# Patient Record
Sex: FEMALE | Race: WHITE | NOT HISPANIC OR LATINO | Employment: OTHER | URBAN - METROPOLITAN AREA
[De-identification: names, ages, dates, MRNs, and addresses within clinical notes are randomized per-mention and may not be internally consistent; named-entity substitution may affect disease eponyms.]

---

## 2017-03-13 ENCOUNTER — TRANSCRIBE ORDERS (OUTPATIENT)
Dept: ADMINISTRATIVE | Facility: HOSPITAL | Age: 67
End: 2017-03-13

## 2017-03-13 DIAGNOSIS — R05.9 COUGH: Primary | ICD-10-CM

## 2017-03-13 DIAGNOSIS — F17.200 SMOKER, CURRENT STATUS UNKNOWN: ICD-10-CM

## 2017-03-23 ENCOUNTER — HOSPITAL ENCOUNTER (OUTPATIENT)
Dept: CT IMAGING | Facility: HOSPITAL | Age: 67
Discharge: HOME | End: 2017-03-23
Admitting: FAMILY MEDICINE

## 2017-03-23 DIAGNOSIS — F17.200 SMOKER, CURRENT STATUS UNKNOWN: ICD-10-CM

## 2017-03-23 DIAGNOSIS — R05.9 COUGH: ICD-10-CM

## 2017-03-23 PROCEDURE — G0297 LDCT FOR LUNG CA SCREEN: HCPCS

## 2017-03-28 ENCOUNTER — HOSPITAL ENCOUNTER (OUTPATIENT)
Dept: RESPIRATORY THERAPY | Facility: HOSPITAL | Age: 67
Discharge: HOME | End: 2017-03-28
Admitting: FAMILY MEDICINE

## 2017-03-28 DIAGNOSIS — F17.200 SMOKER, CURRENT STATUS UNKNOWN: ICD-10-CM

## 2017-03-28 LAB
BDY SITE: NORMAL
HGB BLDA-MCNC: 13.5 G/DL
SAO2 % BLDCOA: 92 % (ref 90–98.5)

## 2017-03-28 PROCEDURE — 94726 PLETHYSMOGRAPHY LUNG VOLUMES: CPT

## 2017-03-28 PROCEDURE — 94010 BREATHING CAPACITY TEST: CPT

## 2017-03-28 PROCEDURE — 82820 HEMOGLOBIN-OXYGEN AFFINITY: CPT | Performed by: FAMILY MEDICINE

## 2017-03-28 PROCEDURE — 94729 DIFFUSING CAPACITY: CPT

## 2017-12-14 ENCOUNTER — APPOINTMENT (OUTPATIENT)
Dept: WOMENS IMAGING | Facility: HOSPITAL | Age: 67
End: 2017-12-14

## 2017-12-14 PROCEDURE — 77063 BREAST TOMOSYNTHESIS BI: CPT | Performed by: RADIOLOGY

## 2017-12-14 PROCEDURE — G0202 SCR MAMMO BI INCL CAD: HCPCS | Performed by: RADIOLOGY

## 2019-10-23 ENCOUNTER — APPOINTMENT (OUTPATIENT)
Dept: WOMENS IMAGING | Facility: HOSPITAL | Age: 69
End: 2019-10-23

## 2019-10-23 PROCEDURE — 77063 BREAST TOMOSYNTHESIS BI: CPT | Performed by: RADIOLOGY

## 2019-10-23 PROCEDURE — 77067 SCR MAMMO BI INCL CAD: CPT | Performed by: RADIOLOGY

## 2021-01-14 ENCOUNTER — APPOINTMENT (OUTPATIENT)
Dept: WOMENS IMAGING | Facility: HOSPITAL | Age: 71
End: 2021-01-14

## 2021-01-14 PROCEDURE — 77063 BREAST TOMOSYNTHESIS BI: CPT | Performed by: RADIOLOGY

## 2021-01-14 PROCEDURE — 77067 SCR MAMMO BI INCL CAD: CPT | Performed by: RADIOLOGY

## 2021-03-16 ENCOUNTER — BULK ORDERING (OUTPATIENT)
Dept: CASE MANAGEMENT | Facility: OTHER | Age: 71
End: 2021-03-16

## 2021-03-16 DIAGNOSIS — Z23 IMMUNIZATION DUE: ICD-10-CM

## 2022-08-25 ENCOUNTER — APPOINTMENT (OUTPATIENT)
Dept: WOMENS IMAGING | Facility: HOSPITAL | Age: 72
End: 2022-08-25

## 2022-08-25 PROCEDURE — 77063 BREAST TOMOSYNTHESIS BI: CPT | Performed by: RADIOLOGY

## 2022-08-25 PROCEDURE — 77067 SCR MAMMO BI INCL CAD: CPT | Performed by: RADIOLOGY

## 2023-02-21 RX ORDER — EMPAGLIFLOZIN 10 MG/1
TABLET, FILM COATED ORAL
Qty: 30 TABLET | Refills: 2 | Status: SHIPPED | OUTPATIENT
Start: 2023-02-21

## 2023-02-21 RX ORDER — PROPRANOLOL HYDROCHLORIDE 40 MG/1
TABLET ORAL
Qty: 60 TABLET | Refills: 2 | Status: SHIPPED | OUTPATIENT
Start: 2023-02-21 | End: 2023-02-22 | Stop reason: SDUPTHER

## 2023-02-21 RX ORDER — SIMVASTATIN 40 MG
TABLET ORAL
Qty: 30 TABLET | Refills: 2 | Status: SHIPPED | OUTPATIENT
Start: 2023-02-21 | End: 2023-02-22 | Stop reason: SDUPTHER

## 2023-02-22 ENCOUNTER — OFFICE VISIT (OUTPATIENT)
Dept: FAMILY MEDICINE CLINIC | Facility: CLINIC | Age: 73
End: 2023-02-22
Payer: MEDICARE

## 2023-02-22 VITALS
TEMPERATURE: 98.1 F | HEART RATE: 69 BPM | BODY MASS INDEX: 35.17 KG/M2 | DIASTOLIC BLOOD PRESSURE: 74 MMHG | RESPIRATION RATE: 16 BRPM | WEIGHT: 198.5 LBS | SYSTOLIC BLOOD PRESSURE: 105 MMHG | HEIGHT: 63 IN | OXYGEN SATURATION: 93 %

## 2023-02-22 DIAGNOSIS — E78.2 MIXED HYPERLIPIDEMIA: ICD-10-CM

## 2023-02-22 DIAGNOSIS — E11.40 TYPE 2 DIABETES MELLITUS WITH DIABETIC NEUROPATHY, WITHOUT LONG-TERM CURRENT USE OF INSULIN: Primary | ICD-10-CM

## 2023-02-22 DIAGNOSIS — R79.89 ELEVATED LIVER FUNCTION TESTS: ICD-10-CM

## 2023-02-22 DIAGNOSIS — F41.1 GENERALIZED ANXIETY DISORDER: ICD-10-CM

## 2023-02-22 PROBLEM — G62.9 NEUROPATHY: Status: ACTIVE | Noted: 2017-06-13

## 2023-02-22 PROBLEM — F51.01 PRIMARY INSOMNIA: Status: ACTIVE | Noted: 2023-02-22

## 2023-02-22 PROCEDURE — 99214 OFFICE O/P EST MOD 30 MIN: CPT | Performed by: FAMILY MEDICINE

## 2023-02-22 RX ORDER — GABAPENTIN 300 MG/1
CAPSULE ORAL
COMMUNITY
Start: 2023-02-02

## 2023-02-22 RX ORDER — NORTRIPTYLINE HYDROCHLORIDE 25 MG/1
CAPSULE ORAL
COMMUNITY
Start: 2023-01-22 | End: 2023-02-22 | Stop reason: SDUPTHER

## 2023-02-22 RX ORDER — PROPRANOLOL HYDROCHLORIDE 40 MG/1
40 TABLET ORAL 2 TIMES DAILY
Qty: 180 TABLET | Refills: 1 | Status: SHIPPED | OUTPATIENT
Start: 2023-02-22

## 2023-02-22 RX ORDER — NORTRIPTYLINE HYDROCHLORIDE 25 MG/1
25 CAPSULE ORAL NIGHTLY
Qty: 90 CAPSULE | Refills: 1 | Status: SHIPPED | OUTPATIENT
Start: 2023-02-22

## 2023-02-22 RX ORDER — CLONAZEPAM 1 MG/1
TABLET ORAL
COMMUNITY
Start: 2023-02-02 | End: 2023-03-05

## 2023-02-22 RX ORDER — CHLORAL HYDRATE 500 MG
1000 CAPSULE ORAL
COMMUNITY

## 2023-02-22 RX ORDER — OMEPRAZOLE 20 MG/1
20 TABLET, DELAYED RELEASE ORAL DAILY
COMMUNITY

## 2023-02-22 RX ORDER — MELOXICAM 15 MG/1
TABLET ORAL
COMMUNITY
Start: 2022-11-23

## 2023-02-22 RX ORDER — SIMVASTATIN 40 MG
40 TABLET ORAL DAILY
Qty: 90 TABLET | Refills: 2 | Status: SHIPPED | OUTPATIENT
Start: 2023-02-22

## 2023-02-22 RX ORDER — NAPROXEN 500 MG/1
500 TABLET ORAL
COMMUNITY

## 2023-02-22 RX ORDER — ALPRAZOLAM 0.5 MG/1
0.5 TABLET ORAL
COMMUNITY

## 2023-02-22 NOTE — PATIENT INSTRUCTIONS
Continue your current medications.   You can try increasing the gabapentin to 3 at night.  You had lab tests today. You should receive a call or my chart message with your test results. If you have not received your results in the next 7-10 days, please contact the office.    See neurologist as scheduled.

## 2023-02-22 NOTE — PROGRESS NOTES
"Chief Complaint  Hyperlipidemia, Diabetes, and Anxiety    Subjective    History of Present Illness {CC  Problem List  Visit  Diagnosis   Encounters  Notes  Medications  Labs  Result Review Imaging  Media :23}     Dana Cerda presents to [facility] for Hyperlipidemia, Diabetes, and Anxiety.  Hyperlipidemia  This is a chronic problem. The problem is controlled. Current antihyperlipidemic treatment includes statins (She is on simvastatin and her last LDL was 61.).   Diabetes  She presents for her follow-up diabetic visit. She has type 2 diabetes mellitus. The initial diagnosis of diabetes was made 6 months ago. Associated symptoms include foot paresthesias (worsening. She has history of neuropathy since 2017 but diabetes more recently diagnosed. She is scheduled with new neurologist.). Symptoms are improving (She is currently on jardiance and monitors her sugars daily and are generally 130-140s. She is up to date on eye exam. ).   She is also here for follow up of anxiety. Her anxiety is exacerbated by stress. She is on clonazepam at night but is not working as well. Her sleep is also worse despite klonopin. She contributes some of her sleep issues to her foot pain related to her neuropathy. She is on gabapentin 300mg 1 in am and 2 at night. Occasionally she takes and additional gabapentin mid day. She  Can't take more during the day due to side effects. She is scheduled to see a new neurologist for the neuropathy.       Objective     Vital Signs:   /74   Pulse 69   Temp 98.1 °F (36.7 °C) (Oral)   Resp 16   Ht 160 cm (63\")   Wt 90 kg (198 lb 8 oz)   SpO2 93%   BMI 35.16 kg/m²   Body mass index is 35.16 kg/m².     Physical Exam  Constitutional:       General: She is not in acute distress.  Cardiovascular:      Rate and Rhythm: Normal rate and regular rhythm.      Heart sounds: No murmur heard.  Pulmonary:      Effort: Pulmonary effort is normal. No respiratory " distress.   Neurological:      Mental Status: She is alert.      Cranial Nerves: No cranial nerve deficit.      Sensory: Sensory deficit (bilateral distal foot) present.          Result Review  Data Reviewed:{ Labs  Result Review  Imaging  Med Tab  Media :23}                Assessment and Plan {CC Problem List  Visit Diagnosis  ROS  Review (Popup)  Health Maintenance  Quality  BestPractice  Medications  SmartSets  SnapShot Encounters  Media :23}   Diagnoses and all orders for this visit:    1. Type 2 diabetes mellitus with diabetic neuropathy, without long-term current use of insulin (HCC) (Primary)  -     Comprehensive Metabolic Panel  -     Hemoglobin A1c    2. Mixed hyperlipidemia  -     Lipid Panel  -     Comprehensive Metabolic Panel    3. Generalized anxiety disorder    Other orders  -     simvastatin (ZOCOR) 40 MG tablet; Take 1 tablet by mouth Daily.  Dispense: 90 tablet; Refill: 2  -     propranolol (INDERAL) 40 MG tablet; Take 1 tablet by mouth 2 (Two) Times a Day.  Dispense: 180 tablet; Refill: 1  -     nortriptyline (PAMELOR) 25 MG capsule; Take 1 capsule by mouth Every Night.  Dispense: 90 capsule; Refill: 1        Patient Instructions   Continue your current medications.   You can try increasing the gabapentin to 3 at night.  You had lab tests today. You should receive a call or my chart message with your test results. If you have not received your results in the next 7-10 days, please contact the office.    See neurologist as scheduled.       Driss reviewed and was appropriate.  Patient was given instructions and counseling regarding her condition or for health maintenance advice on the AVS.       Return in about 3 months (around 5/22/2023) for Recheck.    Anel Jeronimo MD

## 2023-02-23 LAB
ALBUMIN SERPL-MCNC: 4.8 G/DL (ref 3.8–4.8)
ALBUMIN/GLOB SERPL: 1.7 {RATIO} (ref 1.2–2.2)
ALP SERPL-CCNC: 112 IU/L (ref 44–121)
ALT SERPL-CCNC: 94 IU/L (ref 0–32)
AST SERPL-CCNC: 134 IU/L (ref 0–40)
BILIRUB SERPL-MCNC: 0.8 MG/DL (ref 0–1.2)
BUN SERPL-MCNC: 9 MG/DL (ref 8–27)
BUN/CREAT SERPL: 13 (ref 12–28)
CALCIUM SERPL-MCNC: 10 MG/DL (ref 8.7–10.3)
CHLORIDE SERPL-SCNC: 95 MMOL/L (ref 96–106)
CHOLEST SERPL-MCNC: 162 MG/DL (ref 100–199)
CO2 SERPL-SCNC: 25 MMOL/L (ref 20–29)
CREAT SERPL-MCNC: 0.7 MG/DL (ref 0.57–1)
EGFRCR SERPLBLD CKD-EPI 2021: 93 ML/MIN/1.73
GLOBULIN SER CALC-MCNC: 2.9 G/DL (ref 1.5–4.5)
GLUCOSE SERPL-MCNC: 132 MG/DL (ref 70–99)
HBA1C MFR BLD: 7.2 % (ref 4.8–5.6)
HDLC SERPL-MCNC: 55 MG/DL
LDLC SERPL CALC-MCNC: 56 MG/DL (ref 0–99)
POTASSIUM SERPL-SCNC: 4.3 MMOL/L (ref 3.5–5.2)
PROT SERPL-MCNC: 7.7 G/DL (ref 6–8.5)
SODIUM SERPL-SCNC: 140 MMOL/L (ref 134–144)
TRIGL SERPL-MCNC: 331 MG/DL (ref 0–149)
VLDLC SERPL CALC-MCNC: 51 MG/DL (ref 5–40)

## 2023-02-24 ENCOUNTER — TELEPHONE (OUTPATIENT)
Dept: FAMILY MEDICINE CLINIC | Facility: CLINIC | Age: 73
End: 2023-02-24
Payer: MEDICARE

## 2023-02-24 NOTE — TELEPHONE ENCOUNTER
Caller: Dana Cerda    Relationship: Self    Best call back number: [phone]    What medication are you requesting: MEDICATION TO TREAT SYMPTOMS, ANTIBIOTIC    What are your current symptoms: FREQUENT, PAINFUL URINATION    How long have you been experiencing symptoms: SINCE 02-    Have you had these symptoms before:    [x] Yes  [] No    Have you been treated for these symptoms before:   [x] Yes  [] No    If a prescription is needed, what is your preferred pharmacy and phone number: [facility] [id number] - [facility] [address] [facility] [phone] [facility] [phone]      Additional notes: PATIENT STATED SHE USED AN AT HOME TEST TRIP AND LEUCOCYTES WERE POSITIVE, AND NITRITES WERE NEGATIVE.    PATIENT IS REQUESTING TO KNOW IF A MEDICATION MAY BE SENT IN, OR IF SHE MUST BE SEEN FIRST, AS SHE WAS RECENTLY ALREADY SEEN ON 02-.    PLEASE CALL TO DISCUSS AND ADVISE

## 2023-02-24 NOTE — TELEPHONE ENCOUNTER
Unfortunately she was seen for an unrelated issue and did not have those symptoms at that time.  She should really be seen and have a urinalysis and possible culture sent before starting antibiotic.  Since this is Friday she may need to go to an urgent care.

## 2023-03-03 DIAGNOSIS — F41.1 GENERALIZED ANXIETY DISORDER: Primary | ICD-10-CM

## 2023-03-03 NOTE — TELEPHONE ENCOUNTER
Rx Refill Note  Requested Prescriptions     Pending Prescriptions Disp Refills   • clonazePAM (KlonoPIN) 1 MG tablet [Pharmacy Med Name: CLONAZEPAM 1 MG TABLET] 30 tablet      Sig: TAKE ONE TABLET BY MOUTH DAILY AS NEEDED FOR ANXIETY      Last office visit with prescribing clinician: 2/22/2023   Next office visit with prescribing clinician: 5/22/2023     Reyes Calhoun CMA  03/03/23, 16:05 EST

## 2023-03-05 RX ORDER — CLONAZEPAM 1 MG/1
TABLET ORAL
Qty: 30 TABLET | Refills: 2 | Status: SHIPPED | OUTPATIENT
Start: 2023-03-05

## 2023-03-22 ENCOUNTER — OFFICE VISIT (OUTPATIENT)
Dept: GASTROENTEROLOGY | Facility: CLINIC | Age: 73
End: 2023-03-22
Payer: MEDICARE

## 2023-03-22 VITALS
HEIGHT: 63 IN | DIASTOLIC BLOOD PRESSURE: 82 MMHG | TEMPERATURE: 97.8 F | HEART RATE: 99 BPM | BODY MASS INDEX: 35.26 KG/M2 | SYSTOLIC BLOOD PRESSURE: 125 MMHG | WEIGHT: 199 LBS

## 2023-03-22 DIAGNOSIS — E78.49 OTHER HYPERLIPIDEMIA: ICD-10-CM

## 2023-03-22 DIAGNOSIS — R74.9 ABNORMAL SERUM ENZYME LEVEL, UNSPECIFIED: ICD-10-CM

## 2023-03-22 DIAGNOSIS — R79.89 ELEVATED LIVER FUNCTION TESTS: Primary | ICD-10-CM

## 2023-03-22 DIAGNOSIS — R94.5 ABNORMAL RESULTS OF LIVER FUNCTION STUDIES: ICD-10-CM

## 2023-03-22 DIAGNOSIS — R68.89 OTHER GENERAL SYMPTOMS AND SIGNS: ICD-10-CM

## 2023-03-22 DIAGNOSIS — R74.8 ABNORMAL LEVELS OF OTHER SERUM ENZYMES: ICD-10-CM

## 2023-03-22 PROCEDURE — 99204 OFFICE O/P NEW MOD 45 MIN: CPT | Performed by: NURSE PRACTITIONER

## 2023-03-22 PROCEDURE — 1159F MED LIST DOCD IN RCRD: CPT | Performed by: NURSE PRACTITIONER

## 2023-03-22 PROCEDURE — 1160F RVW MEDS BY RX/DR IN RCRD: CPT | Performed by: NURSE PRACTITIONER

## 2023-03-22 NOTE — PROGRESS NOTES
Chief Complaint   Patient presents with   • Elevated Hepatic Enzymes       HPI    Dana Cerda is a  72 y.o. female here to establish care as a new patient for elevated liver function test.    This patient will also follow with Dr. Kinney.    Past medical history of anxiety, breast cancer, irritable bowel syndrome, fatty liver, hyperlipidemia, diabetes along with sleep disorder.    Patient reports she is been aware of elevated liver function test for a number of years she is even been told she has fatty liver at 1 point. Labs from 4 weeks ago with , ALT 94,  with normal bilirubin.  Patient noted to have mildly elevated transaminases dating back to 2014.  She has never had a formal work-up for this to rule out autoimmune, viral or assess for metabolic liver disease.  No recent liver imaging.    She reports increased alcohol consumption in her younger years but rarely uses alcohol now.  May be 1 or 2 drinks a year.  Denies right upper quadrant pain.  No nausea or vomiting.  She takes daily PPI therapy to manage GERD.  Her appetite is good.  Weight is stable.  BMI 35.25.    She reports irritable bowel syndrome that fluctuates between diarrhea and constipation but this has been stable as of late.  Denies personal history of colon polyps or family history colon cancer.    [facility] - fixed angulated sigmoid colon.  Incomplete exam.  Significant sigmoid diverticulosis with internal hemorrhoids.  Plans for repeat 2027.    C-scope 2017 with diverticulosis. (Dr. Angeles)    Past Medical History:   Diagnosis Date   • Abnormal blood finding    • Abnormal finding on radiological examination of breast     calcifications   • Abnormal results of liver function studies    • Abnormal weight gain    • Acute bronchitis, unspecified    • Acute bronchospasm    • Allergic urticaria    • Anxiety state    • Back pain    • Blood in stool    • Breast cancer in situ    • Constipation, unspecified    •  Cough    • Diarrhea, unspecified    • Diverticulitis of large intestine without perforation or abscess without bleeding    • Dysuria    • Edema    • Essential (primary) hypertension    • Fatty liver    • Fracture of radius, distal, closed    • SHIRA (generalized anxiety disorder)    • Hematuria    • Hyperglycemia    • Hyperlipidemia    • Infection of nail bed of finger of right hand     right index finger   • Irritable bowel syndrome    • Left lower quadrant pain    • Left shoulder pain     with adhesive capsulitis   • Lump or mass in breast     solid on left   • Myalgia    • Neuropathy    • Paresthesia    • Sleep disorder, unspecified    • Tobacco use    • Type 2 diabetes mellitus (HCC)    • 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      PRN Meds:     Allergies   Allergen Reactions   • Iodinated Contrast Media Shortness Of Breath     PT STATES HAD THROAT SWELLING WITH CT SCAN    • Penicillins Other (See Comments)     STATES WAS 12 YRS AND SHUT DOWN WHOLE BODY PER PT   • Cefaclor Nausea And Vomiting       Social History     Socioeconomic History   • Marital status:    • Number of children: 4   Tobacco Use   • Smoking status: Former     Packs/day: 1.00     Years: 40.00     Pack years: 40.00     Types: Cigarettes     Start date: 1967     Quit date: 2019     Years since quittin.2     Passive exposure: Past   • Smokeless tobacco: Never   Vaping Use   • Vaping Use: Never used   Substance and Sexual Activity   • Alcohol use: Not  Currently     Comment: A beer or glass of wine occassionally...socially   • Drug use: Never   • Sexual activity: Not Currently     Partners: Male     Comment: hysterectomy       Family History   Problem Relation Age of Onset   • Hyperlipidemia Mother    • Irritable bowel syndrome Mother    • Thyroid cancer Maternal Aunt    • Colon cancer Maternal Aunt    • Alcohol abuse Sister        Review of Systems   Constitutional: Negative for activity change, appetite change, fatigue and unexpected weight change.   HENT: Negative for trouble swallowing.    Eyes: Negative.    Respiratory: Negative.    Cardiovascular: Negative.    Gastrointestinal: Negative for abdominal distention, abdominal pain, anal bleeding, blood in stool, constipation, diarrhea, nausea, rectal pain and vomiting.   Endocrine: Negative.    Genitourinary: Negative.    Musculoskeletal: Negative.    Allergic/Immunologic: Negative.    Neurological: Negative.    Hematological: Negative.    Psychiatric/Behavioral: Negative.        Vitals:    03/22/23 1257   BP: 125/82   Pulse: 99   Temp: 97.8 °F (36.6 °C)       Physical Exam  Constitutional:       Appearance: She is well-developed. She is obese.   Abdominal:      General: Bowel sounds are normal. There is no distension.      Palpations: Abdomen is soft. There is no mass.      Tenderness: There is no abdominal tenderness. There is no guarding.      Hernia: No hernia is present.   Skin:     General: Skin is warm and dry.      Capillary Refill: Capillary refill takes less than 2 seconds.   Neurological:      Mental Status: She is alert and oriented to person, place, and time.   Psychiatric:         Behavior: Behavior normal.     Assessment    Diagnoses and all orders for this visit:    Elevated liver function tests (Primary)  -     US Liver; Future  -     Alpha - 1 - Antitrypsin  -     CLAIRE  -     Anti-Smooth Muscle Antibody Titer  -     Ceruloplasmin  -     Gamma GT  -     Hemochromatosis Mutation  -     Hepatitis  Panel, Acute  -     Mitochondrial Antibodies, M2  -     CBC & Differential  -     Comprehensive Metabolic Panel  -     HILL Fibrosure  -     TSH  -     Celiac Comprehensive Panel  -     Protime-INR     Plan    Pleasant 72-year-old female with several years of mildly elevated transaminases presenting today for new patient consult.  At this point recommend serologic work-up as above to rule out autoimmune, viral, metabolic liver disease.  High suspicion for metabolic disease given her history of diabetes and elevated BMI as well as reported history of fatty liver disease.  Recommend liver ultrasound as well.  We discussed the possibility of a liver biopsy if warranted but will await work-up first.  Follow-up 3 months.         SATHISH Kenyon  [facility]  [address] [place] [address]  Office: [phone]

## 2023-03-23 LAB
A1AT SERPL-MCNC: 158 MG/DL (ref 101–187)
ALBUMIN SERPL-MCNC: 4.5 G/DL (ref 3.8–4.8)
ALBUMIN/GLOB SERPL: 1.7 {RATIO} (ref 1.2–2.2)
ALP SERPL-CCNC: 113 IU/L (ref 44–121)
ALT SERPL-CCNC: 85 IU/L (ref 0–32)
ANA SER QL: NEGATIVE
AST SERPL-CCNC: 71 IU/L (ref 0–40)
BASOPHILS # BLD AUTO: 0.1 X10E3/UL (ref 0–0.2)
BASOPHILS NFR BLD AUTO: 1 %
BILIRUB SERPL-MCNC: 0.6 MG/DL (ref 0–1.2)
BUN SERPL-MCNC: 10 MG/DL (ref 8–27)
BUN/CREAT SERPL: 14 (ref 12–28)
CALCIUM SERPL-MCNC: 9.7 MG/DL (ref 8.7–10.3)
CERULOPLASMIN SERPL-MCNC: 31.4 MG/DL (ref 19–39)
CHLORIDE SERPL-SCNC: 96 MMOL/L (ref 96–106)
CO2 SERPL-SCNC: 27 MMOL/L (ref 20–29)
CREAT SERPL-MCNC: 0.74 MG/DL (ref 0.57–1)
EGFRCR SERPLBLD CKD-EPI 2021: 87 ML/MIN/1.73
ENDOMYSIUM IGA SER QL: NEGATIVE
EOSINOPHIL # BLD AUTO: 0.1 X10E3/UL (ref 0–0.4)
EOSINOPHIL NFR BLD AUTO: 2 %
ERYTHROCYTE [DISTWIDTH] IN BLOOD BY AUTOMATED COUNT: 13 % (ref 11.7–15.4)
GGT SERPL-CCNC: 184 IU/L (ref 0–60)
GLIADIN PEPTIDE IGA SER-ACNC: 4 UNITS (ref 0–19)
GLIADIN PEPTIDE IGG SER-ACNC: 2 UNITS (ref 0–19)
GLOBULIN SER CALC-MCNC: 2.6 G/DL (ref 1.5–4.5)
GLUCOSE SERPL-MCNC: 143 MG/DL (ref 70–99)
HAV IGM SERPL QL IA: NEGATIVE
HBV CORE IGM SERPL QL IA: NEGATIVE
HBV SURFACE AG SERPL QL IA: NEGATIVE
HCT VFR BLD AUTO: 44.6 % (ref 34–46.6)
HCV AB SERPL QL IA: NORMAL
HCV IGG SERPL QL IA: NON REACTIVE
HGB BLD-MCNC: 15.1 G/DL (ref 11.1–15.9)
IGA SERPL-MCNC: 200 MG/DL (ref 87–352)
IMM GRANULOCYTES # BLD AUTO: 0 X10E3/UL (ref 0–0.1)
IMM GRANULOCYTES NFR BLD AUTO: 0 %
INR PPP: 0.9 (ref 0.9–1.2)
LYMPHOCYTES # BLD AUTO: 2.2 X10E3/UL (ref 0.7–3.1)
LYMPHOCYTES NFR BLD AUTO: 37 %
MCH RBC QN AUTO: 31.1 PG (ref 26.6–33)
MCHC RBC AUTO-ENTMCNC: 33.9 G/DL (ref 31.5–35.7)
MCV RBC AUTO: 92 FL (ref 79–97)
MITOCHONDRIA M2 IGG SER-ACNC: <20 UNITS (ref 0–20)
MONOCYTES # BLD AUTO: 0.3 X10E3/UL (ref 0.1–0.9)
MONOCYTES NFR BLD AUTO: 6 %
NEUTROPHILS # BLD AUTO: 3.2 X10E3/UL (ref 1.4–7)
NEUTROPHILS NFR BLD AUTO: 54 %
PLATELET # BLD AUTO: 212 X10E3/UL (ref 150–450)
POTASSIUM SERPL-SCNC: 4.5 MMOL/L (ref 3.5–5.2)
PROT SERPL-MCNC: 7.1 G/DL (ref 6–8.5)
PROTHROMBIN TIME: 9.8 SEC (ref 9.1–12)
RBC # BLD AUTO: 4.86 X10E6/UL (ref 3.77–5.28)
SMA IGG SER-ACNC: 10 UNITS (ref 0–19)
SODIUM SERPL-SCNC: 142 MMOL/L (ref 134–144)
TSH SERPL DL<=0.005 MIU/L-ACNC: 2.66 UIU/ML (ref 0.45–4.5)
TTG IGA SER-ACNC: <2 U/ML (ref 0–3)
TTG IGG SER-ACNC: <2 U/ML (ref 0–5)
WBC # BLD AUTO: 5.9 X10E3/UL (ref 3.4–10.8)

## 2023-03-25 LAB
A2 MACROGLOB SERPL-MCNC: 159 MG/DL (ref 110–276)
ALT SERPL W P-5'-P-CCNC: 89 IU/L (ref 0–40)
APO A-I SERPL-MCNC: 175 MG/DL (ref 116–209)
AST SERPL W P-5'-P-CCNC: 82 IU/L (ref 0–40)
BILIRUB SERPL-MCNC: 0.5 MG/DL (ref 0–1.2)
CHOLEST SERPL-MCNC: 155 MG/DL (ref 100–199)
FIBROSIS SCORING:: ABNORMAL
FIBROSIS STAGE SERPL QL: ABNORMAL
GGT SERPL-CCNC: 189 IU/L (ref 0–60)
GLUCOSE SERPL-MCNC: 137 MG/DL (ref 70–99)
HAPTOGLOB SERPL-MCNC: 158 MG/DL (ref 37–355)
INTERPRETATIONS: (REFERENCE): ABNORMAL
LABORATORY COMMENT REPORT: ABNORMAL
LIVER FIBR SCORE SERPL CALC.FIBROSURE: 0.26 (ref 0–0.21)
NASH SCORING (REFERENCE): ABNORMAL
NECROINFLAMMATORY ACT GRADE SERPL QL: ABNORMAL
NECROINFLAMMATORY ACT SCORE SERPL: 0.75
SERVICE CMNT-IMP: ABNORMAL
STEATOSIS GRADE (REFERENCE): ABNORMAL
STEATOSIS GRADING (REFERENCE): ABNORMAL
STEATOSIS SCORE (REFERENCE): 0.91 (ref 0–0.3)
TRIGL SERPL-MCNC: 260 MG/DL (ref 0–149)

## 2023-03-28 LAB — HFE GENE MUT ANL BLD/T: NORMAL

## 2023-03-31 NOTE — PROGRESS NOTES
Please inform the patient serologic work-up leaning towards fatty liver disease.  Await liver ultrasound.  Keep follow-up with Dr. Kinney. 84

## 2023-04-01 ENCOUNTER — TELEPHONE (OUTPATIENT)
Dept: GASTROENTEROLOGY | Facility: CLINIC | Age: 73
End: 2023-04-01
Payer: MEDICARE

## 2023-04-01 NOTE — TELEPHONE ENCOUNTER
----- Message from SATHISH Kenyon sent at 3/31/2023 11:26 AM EDT -----  Please inform the patient serologic work-up leaning towards fatty liver disease.  Await liver ultrasound.  Keep follow-up with Dr. Kinney.

## 2023-04-27 DIAGNOSIS — E11.40 TYPE 2 DIABETES MELLITUS WITH DIABETIC NEUROPATHY, WITHOUT LONG-TERM CURRENT USE OF INSULIN: Primary | ICD-10-CM

## 2023-04-27 RX ORDER — GABAPENTIN 300 MG/1
CAPSULE ORAL
Qty: 120 CAPSULE | Refills: 0 | Status: SHIPPED | OUTPATIENT
Start: 2023-04-27

## 2023-05-09 ENCOUNTER — HOSPITAL ENCOUNTER (OUTPATIENT)
Dept: ULTRASOUND IMAGING | Facility: HOSPITAL | Age: 73
Discharge: HOME | End: 2023-05-09
Admitting: NURSE PRACTITIONER
Payer: MEDICARE

## 2023-05-09 DIAGNOSIS — R79.89 ELEVATED LIVER FUNCTION TESTS: ICD-10-CM

## 2023-05-09 PROCEDURE — 76705 ECHO EXAM OF ABDOMEN: CPT

## 2023-05-09 NOTE — PROGRESS NOTES
Please inform the patient liver ultrasound shows hepatic steatosis or fatty liver without findings of cirrhosis.  Treatment for fatty liver is low-fat diet, increase cardiovascular exercise and weight reduction to achieve an ideal BMI over the next 3 years.  Keep upcoming appoint with Dr. Kinney to discuss further.

## 2023-05-20 RX ORDER — EMPAGLIFLOZIN 10 MG/1
TABLET, FILM COATED ORAL
Qty: 30 TABLET | Refills: 2 | Status: SHIPPED | OUTPATIENT
Start: 2023-05-20 | End: 2023-05-22 | Stop reason: SDUPTHER

## 2023-05-22 ENCOUNTER — OFFICE VISIT (OUTPATIENT)
Dept: FAMILY MEDICINE CLINIC | Facility: CLINIC | Age: 73
End: 2023-05-22
Payer: MEDICARE

## 2023-05-22 VITALS
WEIGHT: 198.7 LBS | SYSTOLIC BLOOD PRESSURE: 120 MMHG | BODY MASS INDEX: 35.21 KG/M2 | TEMPERATURE: 97.7 F | OXYGEN SATURATION: 96 % | HEIGHT: 63 IN | RESPIRATION RATE: 16 BRPM | DIASTOLIC BLOOD PRESSURE: 79 MMHG | HEART RATE: 67 BPM

## 2023-05-22 DIAGNOSIS — G62.9 NEUROPATHY: Primary | ICD-10-CM

## 2023-05-22 DIAGNOSIS — E78.2 MIXED HYPERLIPIDEMIA: ICD-10-CM

## 2023-05-22 DIAGNOSIS — E11.9 TYPE 2 DIABETES MELLITUS WITHOUT COMPLICATION, WITHOUT LONG-TERM CURRENT USE OF INSULIN: ICD-10-CM

## 2023-05-22 DIAGNOSIS — F41.1 GENERALIZED ANXIETY DISORDER: ICD-10-CM

## 2023-05-22 RX ORDER — ALPRAZOLAM 0.5 MG/1
0.5 TABLET ORAL DAILY PRN
Qty: 30 TABLET | Refills: 0 | Status: SHIPPED | OUTPATIENT
Start: 2023-05-22

## 2023-05-22 RX ORDER — NORTRIPTYLINE HYDROCHLORIDE 25 MG/1
25 CAPSULE ORAL NIGHTLY
Qty: 90 CAPSULE | Refills: 1 | Status: SHIPPED | OUTPATIENT
Start: 2023-05-22

## 2023-05-22 NOTE — PROGRESS NOTES
"Chief Complaint  Hyperlipidemia, Diabetes, and Anxiety    Subjective    History of Present Illness {CC  Problem List  Visit  Diagnosis   Encounters  Notes  Medications  Labs  Result Review Imaging  Media :23}     Dana Cerda presents to [facility] for Hyperlipidemia, Diabetes, and Anxiety.  History of Present Illness   She presents for follow up of hyperlipidemia.  She is currently on simvastatin 40 mg daily.  She reports good compliance and tolerability of the medication.  Her last LDL was 56 but her triglycerides were elevated over 300.      She is also here for follow-up of diabetes.  She is currently on Jardiance.  Her last A1c was 7.2.      She is also here for follow-up of neuropathy. Her neuropathy preceeded her diabetes. She is currently on gabapentin 300mg 3-4 daily. She always takes 1 in morning and 2 at night. When severe, she will take a 4th dose in the afternoon as needed. She has seen neurologist in the past but would like a new one closer to where she lives.     She is also here for follow-up of anxiety. She is currently on klonopin nightly and on rare occasion will take xanax as needed. Her last xanax refill for #30 was 10 months ago and she only has a few remaining so she is requesting refill.    Objective     Vital Signs:   /79   Pulse 67   Temp 97.7 °F (36.5 °C) (Oral)   Resp 16   Ht 160 cm (63\")   Wt 90.1 kg (198 lb 11.2 oz)   SpO2 96%   BMI 35.20 kg/m²   Body mass index is 35.2 kg/m².     Physical Exam  Constitutional:       General: She is not in acute distress.  Cardiovascular:      Rate and Rhythm: Normal rate and regular rhythm.      Heart sounds: No murmur heard.  Pulmonary:      Effort: No respiratory distress.      Breath sounds: Normal breath sounds.   Neurological:      General: No focal deficit present.      Mental Status: She is alert.   Psychiatric:         Behavior: Behavior normal.          Result Review  Data Reviewed:{ " Labs  Result Review  Imaging  Med Tab  Media :23}                Assessment and Plan {CC Problem List  Visit Diagnosis  ROS  Review (Popup)  Health Maintenance  Quality  BestPractice  Medications  SmartSets  SnapShot Encounters  Media :23}   Diagnoses and all orders for this visit:    1. Neuropathy (Primary)  -     Ambulatory Referral to Neurology    2. Mixed hyperlipidemia    3. Type 2 diabetes mellitus without complication, without long-term current use of insulin    4. Generalized anxiety disorder  -     ALPRAZolam (XANAX) 0.5 MG tablet; Take 1 tablet by mouth Daily As Needed for Anxiety.  Dispense: 30 tablet; Refill: 0    Other orders  -     empagliflozin (Jardiance) 10 MG tablet tablet; Take 1 tablet by mouth Daily.  Dispense: 90 tablet; Refill: 1  -     nortriptyline (PAMELOR) 25 MG capsule; Take 1 capsule by mouth Every Night.  Dispense: 90 capsule; Refill: 1        Patient Instructions   Continue your current medications.   You have been given a controlled medication. This medication has the risk of dependence and addiction. Use the medication only as directed. You will be required to be seen every 90 days and have routine drug screening and Driss monitoring while on the medication. If you stop the medication, be sure to dispose of the medication properly.    Plan fasting labs and physical in 3 months.     Driss was reviewed and was appropriate.   Patient was given instructions and counseling regarding her condition or for health maintenance advice on the AVS.       Return in about 3 months (around 8/22/2023) for Annual, Medicare Wellness.    Anel Jeronimo MD

## 2023-05-22 NOTE — PATIENT INSTRUCTIONS
Continue your current medications.   You have been given a controlled medication. This medication has the risk of dependence and addiction. Use the medication only as directed. You will be required to be seen every 90 days and have routine drug screening and Driss monitoring while on the medication. If you stop the medication, be sure to dispose of the medication properly.    Plan fasting labs and physical in 3 months.

## 2023-06-01 NOTE — TELEPHONE ENCOUNTER
Can we double check and make sure that she is not taking naproxen.  She should take meloxicam or naproxen but not both.

## 2023-06-04 DIAGNOSIS — F41.1 GENERALIZED ANXIETY DISORDER: ICD-10-CM

## 2023-06-04 RX ORDER — CLONAZEPAM 1 MG/1
TABLET ORAL
Qty: 30 TABLET | Refills: 2 | Status: SHIPPED | OUTPATIENT
Start: 2023-06-04

## 2023-06-06 RX ORDER — MELOXICAM 15 MG/1
TABLET ORAL
Qty: 30 TABLET | Refills: 0 | OUTPATIENT
Start: 2023-06-06

## 2023-06-12 ENCOUNTER — TELEPHONE (OUTPATIENT)
Dept: GASTROENTEROLOGY | Facility: CLINIC | Age: 73
End: 2023-06-12
Payer: MEDICARE

## 2023-06-12 NOTE — TELEPHONE ENCOUNTER
Pt reviewed results via "[employer]. Sent pt "[employer] msg advising of results. Advised to call if any questions.

## 2023-06-12 NOTE — TELEPHONE ENCOUNTER
----- Message from SATHISH Kenyon sent at 5/9/2023 12:39 PM EDT -----  Please inform the patient liver ultrasound shows hepatic steatosis or fatty liver without findings of cirrhosis.  Treatment for fatty liver is low-fat diet, increase cardiovascular exercise and weight reduction to achieve an ideal BMI over the next 3   years.  Keep upcoming appoint with Dr. Kinney to discuss further.

## 2023-08-21 RX ORDER — NORTRIPTYLINE HYDROCHLORIDE 25 MG/1
CAPSULE ORAL
Qty: 90 CAPSULE | Refills: 1 | Status: SHIPPED | OUTPATIENT
Start: 2023-08-21

## 2023-08-24 ENCOUNTER — OFFICE VISIT (OUTPATIENT)
Dept: GASTROENTEROLOGY | Facility: CLINIC | Age: 73
End: 2023-08-24
Payer: MEDICARE

## 2023-08-24 VITALS
WEIGHT: 197.2 LBS | SYSTOLIC BLOOD PRESSURE: 121 MMHG | HEIGHT: 63 IN | DIASTOLIC BLOOD PRESSURE: 80 MMHG | BODY MASS INDEX: 34.94 KG/M2 | TEMPERATURE: 97.4 F

## 2023-08-24 DIAGNOSIS — R79.89 ELEVATED LIVER FUNCTION TESTS: Primary | ICD-10-CM

## 2023-08-24 PROCEDURE — 1159F MED LIST DOCD IN RCRD: CPT | Performed by: INTERNAL MEDICINE

## 2023-08-24 PROCEDURE — 1160F RVW MEDS BY RX/DR IN RCRD: CPT | Performed by: INTERNAL MEDICINE

## 2023-08-24 PROCEDURE — 99213 OFFICE O/P EST LOW 20 MIN: CPT | Performed by: INTERNAL MEDICINE

## 2023-08-24 NOTE — H&P (VIEW-ONLY)
Chief Complaint   Patient presents with   • Elevated Hepatic Enzymes       Dana Cerda is a  73 y.o. female here for a follow up visit for elevated LFTs and fatty liver.    HPI    Patient 73-year-old female with history of hypertension, hyperlipidemia and fatty liver here for follow-up.  Serologic work-up was negative for autoimmune or genetic causes of liver injury and ultrasound confirms the fatty liver.    Past Medical History:   Diagnosis Date   • Abnormal blood finding    • Abnormal finding on radiological examination of breast     calcifications   • Abnormal results of liver function studies    • Abnormal weight gain    • Acute bronchitis, unspecified    • Acute bronchospasm    • Allergic urticaria    • Anxiety state    • Back pain    • Blood in stool    • Breast cancer in situ    • Constipation, unspecified    • Cough    • Diarrhea, unspecified    • Diverticulitis of large intestine without perforation or abscess without bleeding    • Dysuria    • Edema    • Essential (primary) hypertension    • Fatty liver    • Fracture of radius, distal, closed    • SHIRA (generalized anxiety disorder)    • Hematuria    • Hyperglycemia    • Hyperlipidemia    • Infection of nail bed of finger of right hand     right index finger   • Irritable bowel syndrome    • Left lower quadrant pain    • Left shoulder pain     with adhesive capsulitis   • Lump or mass in breast     solid on left   • Myalgia    • Neuropathy    • Paresthesia    • Sleep disorder, unspecified    • Tobacco use    • Type 2 diabetes mellitus    • 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    • Urinary frequency    • Urinary tract infection    • Vitamin D deficiency          Current Outpatient Medications:   •  ALPRAZolam (XANAX) 0.5 MG tablet, Take 1 tablet by mouth Daily As Needed for Anxiety., Disp: 30 tablet, Rfl: 0  •  B COMPLEX-BIOTIN-FA PO, Take  by mouth., Disp: , Rfl:   •  clonazePAM (KlonoPIN) 1 MG tablet, TAKE ONE TABLET BY MOUTH DAILY AS  NEEDED FOR ANXIETY, Disp: 30 tablet, Rfl: 2  •  empagliflozin (Jardiance) 10 MG tablet tablet, Take 1 tablet by mouth Daily., Disp: 90 tablet, Rfl: 1  •  gabapentin (NEURONTIN) 300 MG capsule, TAKE ONE CAPSULE BY MOUTH EVERY MORNING AND TAKE TWO CAPSULES BY MOUTH EVERY EVENING -MAY TAKE AN ADDITIONAL CAPSULE IN AFTERNOON IF NEEDED, Disp: 120 capsule, Rfl: 0  •  meloxicam (MOBIC) 15 MG tablet, , Disp: , Rfl:   •  naproxen (NAPROSYN) 500 MG tablet, Take 1 tablet by mouth., Disp: , Rfl:   •  nortriptyline (PAMELOR) 25 MG capsule, TAKE ONE CAPSULE BY MOUTH ONCE NIGHTLY, Disp: 90 capsule, Rfl: 1  •  Omega-3 Fatty Acids (fish oil) 1000 MG capsule capsule, Take 1 capsule by mouth., Disp: , Rfl:   •  omeprazole OTC (PriLOSEC OTC) 20 MG EC tablet, Take 1 tablet by mouth Daily., Disp: , Rfl:   •  propranolol (INDERAL) 40 MG tablet, Take 1 tablet by mouth 2 (Two) Times a Day., Disp: 180 tablet, Rfl: 1  •  simvastatin (ZOCOR) 40 MG tablet, Take 1 tablet by mouth Daily., Disp: 90 tablet, Rfl: 2    Allergies   Allergen Reactions   • Iodinated Contrast Media Shortness Of Breath     PT STATES HAD THROAT SWELLING WITH CT SCAN    • Penicillins Other (See Comments)     STATES WAS 12 YRS AND SHUT DOWN WHOLE BODY PER PT   • Cefaclor Nausea And Vomiting       Social History     Socioeconomic History   • Marital status:    • Number of children: 4   Tobacco Use   • Smoking status: Former     Packs/day: 1.00     Years: 40.00     Pack years: 40.00     Types: Cigarettes     Start date: 1967     Quit date: 2019     Years since quittin.6     Passive exposure: Past   • Smokeless tobacco: Never   Vaping Use   • Vaping Use: Never used   Substance and Sexual Activity   • Alcohol use: Not Currently     Comment: A beer or glass of wine occassionally...socially   • Drug use: Never   • Sexual activity: Not Currently     Partners: Male     Comment: hysterectomy       Family History   Problem Relation Age of Onset   • Hyperlipidemia  Mother    • Irritable bowel syndrome Mother    • Thyroid cancer Maternal Aunt    • Colon cancer Maternal Aunt    • Alcohol abuse Sister        Review of Systems   Constitutional: Negative.    Respiratory: Negative.     Cardiovascular: Negative.    Gastrointestinal: Negative.    Musculoskeletal: Negative.    Skin: Negative.    Hematological: Negative.      Vitals:    08/24/23 1529   BP: 121/80   Temp: 97.4 °F (36.3 °C)       Physical Exam  Vitals reviewed.   Constitutional:       Appearance: Normal appearance. She is well-developed.   HENT:      Head: Normocephalic and atraumatic.   Eyes:      General: No scleral icterus.     Pupils: Pupils are equal, round, and reactive to light.   Cardiovascular:      Rate and Rhythm: Normal rate and regular rhythm.      Heart sounds: Normal heart sounds.   Pulmonary:      Effort: Pulmonary effort is normal. No respiratory distress.      Breath sounds: Normal breath sounds.   Abdominal:      General: Bowel sounds are normal. There is no distension.      Palpations: Abdomen is soft.      Tenderness: There is no abdominal tenderness.   Skin:     General: Skin is warm and dry.      Coloration: Skin is not jaundiced.      Findings: No rash.   Neurological:      General: No focal deficit present.      Mental Status: She is alert and oriented to person, place, and time.      Cranial Nerves: No cranial nerve deficit.   Psychiatric:         Mood and Affect: Mood normal.         Behavior: Behavior normal.         Thought Content: Thought content normal.     No visits with results within 2 Month(s) from this visit.   Latest known visit with results is:   Office Visit on 03/22/2023   Component Date Value Ref Range Status   • A-1 Antitrypsin 03/22/2023 158  101 - 187 mg/dL Final   • CLAIRE Direct 03/22/2023 Negative  Negative Final   • Smooth Muscle Ab 03/22/2023 10  0 - 19 Units Final   • Ceruloplasmin 03/22/2023 31.4  19.0 - 39.0 mg/dL Final   • GGT 03/22/2023 184 (H)  0 - 60 IU/L Final   •  Hemochromatosis Gene 03/22/2023 Comment   Final   • Hep A IgM 03/22/2023 Negative  Negative Final   • Hepatitis B Surface Ag 03/22/2023 Negative  Negative Final   • Hep B Core IgM 03/22/2023 Negative  Negative Final   • Hepatitis C Ab 03/22/2023 Non Reactive  Non Reactive Final   • Mitochondrial Ab 03/22/2023 <20.0  0.0 - 20.0 Units Final   • WBC 03/22/2023 5.9  3.4 - 10.8 x10E3/uL Final   • RBC 03/22/2023 4.86  3.77 - 5.28 x10E6/uL Final   • Hemoglobin 03/22/2023 15.1  11.1 - 15.9 g/dL Final   • Hematocrit 03/22/2023 44.6  34.0 - 46.6 % Final   • MCV 03/22/2023 92  79 - 97 fL Final   • MCH 03/22/2023 31.1  26.6 - 33.0 pg Final   • MCHC 03/22/2023 33.9  31.5 - 35.7 g/dL Final   • RDW 03/22/2023 13.0  11.7 - 15.4 % Final   • Platelets 03/22/2023 212  150 - 450 x10E3/uL Final   • Neutrophil Rel % 03/22/2023 54  Not Estab. % Final   • Lymphocyte Rel % 03/22/2023 37  Not Estab. % Final   • Monocyte Rel % 03/22/2023 6  Not Estab. % Final   • Eosinophil Rel % 03/22/2023 2  Not Estab. % Final   • Basophil Rel % 03/22/2023 1  Not Estab. % Final   • Neutrophils Absolute 03/22/2023 3.2  1.4 - 7.0 x10E3/uL Final   • Lymphocytes Absolute 03/22/2023 2.2  0.7 - 3.1 x10E3/uL Final   • Monocytes Absolute 03/22/2023 0.3  0.1 - 0.9 x10E3/uL Final   • Eosinophils Absolute 03/22/2023 0.1  0.0 - 0.4 x10E3/uL Final   • Basophils Absolute 03/22/2023 0.1  0.0 - 0.2 x10E3/uL Final   • Immature Granulocyte Rel % 03/22/2023 0  Not Estab. % Final   • Immature Grans Absolute 03/22/2023 0.0  0.0 - 0.1 x10E3/uL Final   • Glucose 03/22/2023 143 (H)  70 - 99 mg/dL Final   • BUN 03/22/2023 10  8 - 27 mg/dL Final   • Creatinine 03/22/2023 0.74  0.57 - 1.00 mg/dL Final   • EGFR Result 03/22/2023 87  >59 mL/min/1.73 Final   • BUN/Creatinine Ratio 03/22/2023 14  12 - 28 Final   • Sodium 03/22/2023 142  134 - 144 mmol/L Final   • Potassium 03/22/2023 4.5  3.5 - 5.2 mmol/L Final   • Chloride 03/22/2023 96  96 - 106 mmol/L Final   • Total CO2 03/22/2023 27   20 - 29 mmol/L Final   • Calcium 2023 9.7  8.7 - 10.3 mg/dL Final   • Total Protein 2023 7.1  6.0 - 8.5 g/dL Final   • Albumin 2023 4.5  3.8 - 4.8 g/dL Final   • Globulin 2023 2.6  1.5 - 4.5 g/dL Final   • A/G Ratio 2023 1.7  1.2 - 2.2 Final   • Total Bilirubin 2023 0.6  0.0 - 1.2 mg/dL Final   • Alkaline Phosphatase 2023 113  44 - 121 IU/L Final   • AST (SGOT) 2023 71 (H)  0 - 40 IU/L Final   • ALT (SGPT) 2023 85 (H)  0 - 32 IU/L Final   • Fibrosis Score (References) 2023 0.26 (H)  0.00 - 0.21 Final   • Fibrosis Stage (Reference) 2023 F0-F1   Final   • Steatosis Score (Reference) 2023 0.91 (H)  0.00 - 0.30 Final   • Steatosis Grade (Reference) 2023 Comment   Final   • HILL Score (Reference) 2023 0.75 (H)  0.25 Final   • Hill Grade (Reference) 2023 Comment   Final   • Height: (Reference) 2023 63  in Final   • Weight: (Reference) 2023 199  LBS Final   • Alpha 2-Macroglobulins, Qn 2023 159  110 - 276 mg/dL Final   • Haptoglobin 2023 158  37 - 355 mg/dL Final   • Apolipoprotein A-1 2023 175  116 - 209 mg/dL Final   • Total Bilirubin 2023 0.5  0.0 - 1.2 mg/dL Final   • GGT 2023 189 (H)  0 - 60 IU/L Final   • ALT (SGPT) 2023 89 (H)  0 - 40 IU/L Final   • AST (SGOT) P5P (Reference) 2023 82 (H)  0 - 40 IU/L Final   • Cholesterol, Total (Reference) 2023 155  100 - 199 mg/dL Final   • Glucose, Serum (Reference) 2023 137 (H)  70 - 99 mg/dL Final   • Triglycerides 2023 260 (H)  0 - 149 mg/dL Final   • Interpretations: (Reference) 2023 Comment   Final   • Fibrosis Scorin2023 Comment   Final   • Steatosis Grading (Reference) 2023 Comment   Final   • Hill Scoring (Reference) 2023 Comment   Final   • Limitations: (Reference) 2023 Comment   Final   • Comment (Reference) 2023 Comment   Final   • TSH 2023 2.660  0.450 - 4.500  uIU/mL Final   • Gliadin Deamidated Peptide Ab, IgA 03/22/2023 4  0 - 19 units Final   • Deaminated Gliadin Ab IgG 03/22/2023 2  0 - 19 units Final   • Tissue Transglutaminase IgA 03/22/2023 <2  0 - 3 U/mL Final   • Tissue Transglutaminase IgG 03/22/2023 <2  0 - 5 U/mL Final   • Endomysial IgA 03/22/2023 Negative  Negative Final   • IgA 03/22/2023 200  87 - 352 mg/dL Final   • INR 03/22/2023 0.9  0.9 - 1.2 Final   • Protime 03/22/2023 9.8  9.1 - 12.0 sec Final   • Interpretation 03/22/2023 Comment   Final       Diagnoses and all orders for this visit:    1. Elevated liver function tests (Primary)  -     Obtain Informed Consent; Standing  -     CT Needle Biopsy Liver; Future  -     Tissue Pathology Exam; Standing      Patient 73-year-old female with history of hyperlipidemia, hypertension, hyperglycemia noted with persistently elevated LFTs.  Patient has undergone serologic work-up which has been negative for specific findings ultrasound did confirm fatty liver.  Now the question to differentiate nonalcoholic steatohepatitis from benign fatty liver patient will recommend liver biopsy.  Patient agreed to proceed and will await results of the biopsy for further recommendations on therapy.

## 2023-08-24 NOTE — PROGRESS NOTES
Chief Complaint   Patient presents with    Elevated Hepatic Enzymes       Dana Cerda is a  73 y.o. female here for a follow up visit for elevated LFTs and fatty liver.    HPI    Patient 73-year-old female with history of hypertension, hyperlipidemia and fatty liver here for follow-up.  Serologic work-up was negative for autoimmune or genetic causes of liver injury and ultrasound confirms the fatty liver.    Past Medical History:   Diagnosis Date    Abnormal blood finding     Abnormal finding on radiological examination of breast     calcifications    Abnormal results of liver function studies     Abnormal weight gain     Acute bronchitis, unspecified     Acute bronchospasm     Allergic urticaria     Anxiety state     Back pain     Blood in stool     Breast cancer in situ     Constipation, unspecified     Cough     Diarrhea, unspecified     Diverticulitis of large intestine without perforation or abscess without bleeding     Dysuria     Edema     Essential (primary) hypertension     Fatty liver     Fracture of radius, distal, closed     SHIRA (generalized anxiety disorder)     Hematuria     Hyperglycemia     Hyperlipidemia     Infection of nail bed of finger of right hand     right index finger    Irritable bowel syndrome     Left lower quadrant pain     Left shoulder pain     with adhesive capsulitis    Lump or mass in breast     solid on left    Myalgia     Neuropathy     Paresthesia     Sleep disorder, unspecified     Tobacco use     Type 2 diabetes mellitus     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     Urinary frequency     Urinary tract infection     Vitamin D deficiency          Current Outpatient Medications:     ALPRAZolam (XANAX) 0.5 MG tablet, Take 1 tablet by mouth Daily As Needed for Anxiety., Disp: 30 tablet, Rfl: 0    B COMPLEX-BIOTIN-FA PO, Take  by mouth., Disp: , Rfl:     clonazePAM (KlonoPIN) 1 MG tablet, TAKE ONE TABLET BY MOUTH DAILY AS  NEEDED FOR ANXIETY, Disp: 30 tablet, Rfl: 2    empagliflozin (Jardiance) 10 MG tablet tablet, Take 1 tablet by mouth Daily., Disp: 90 tablet, Rfl: 1    gabapentin (NEURONTIN) 300 MG capsule, TAKE ONE CAPSULE BY MOUTH EVERY MORNING AND TAKE TWO CAPSULES BY MOUTH EVERY EVENING -MAY TAKE AN ADDITIONAL CAPSULE IN AFTERNOON IF NEEDED, Disp: 120 capsule, Rfl: 0    meloxicam (MOBIC) 15 MG tablet, , Disp: , Rfl:     naproxen (NAPROSYN) 500 MG tablet, Take 1 tablet by mouth., Disp: , Rfl:     nortriptyline (PAMELOR) 25 MG capsule, TAKE ONE CAPSULE BY MOUTH ONCE NIGHTLY, Disp: 90 capsule, Rfl: 1    Omega-3 Fatty Acids (fish oil) 1000 MG capsule capsule, Take 1 capsule by mouth., Disp: , Rfl:     omeprazole OTC (PriLOSEC OTC) 20 MG EC tablet, Take 1 tablet by mouth Daily., Disp: , Rfl:     propranolol (INDERAL) 40 MG tablet, Take 1 tablet by mouth 2 (Two) Times a Day., Disp: 180 tablet, Rfl: 1    simvastatin (ZOCOR) 40 MG tablet, Take 1 tablet by mouth Daily., Disp: 90 tablet, Rfl: 2    Allergies   Allergen Reactions    Iodinated Contrast Media Shortness Of Breath     PT STATES HAD THROAT SWELLING WITH CT SCAN     Penicillins Other (See Comments)     STATES WAS 12 YRS AND SHUT DOWN WHOLE BODY PER PT    Cefaclor Nausea And Vomiting       Social History     Socioeconomic History    Marital status:     Number of children: 4   Tobacco Use    Smoking status: Former     Packs/day: 1.00     Years: 40.00     Pack years: 40.00     Types: Cigarettes     Start date: 1967     Quit date: 2019     Years since quittin.6     Passive exposure: Past    Smokeless tobacco: Never   Vaping Use    Vaping Use: Never used   Substance and Sexual Activity    Alcohol use: Not Currently     Comment: A beer or glass of wine occassionally...socially    Drug use: Never    Sexual activity: Not Currently     Partners: Male     Comment: hysterectomy       Family History   Problem Relation Age of Onset    Hyperlipidemia  Mother     Irritable bowel syndrome Mother     Thyroid cancer Maternal Aunt     Colon cancer Maternal Aunt     Alcohol abuse Sister        Review of Systems   Constitutional: Negative.    Respiratory: Negative.     Cardiovascular: Negative.    Gastrointestinal: Negative.    Musculoskeletal: Negative.    Skin: Negative.    Hematological: Negative.      Vitals:    08/24/23 1529   BP: 121/80   Temp: 97.4 øF (36.3 øC)       Physical Exam  Vitals reviewed.   Constitutional:       Appearance: Normal appearance. She is well-developed.   HENT:      Head: Normocephalic and atraumatic.   Eyes:      General: No scleral icterus.     Pupils: Pupils are equal, round, and reactive to light.   Cardiovascular:      Rate and Rhythm: Normal rate and regular rhythm.      Heart sounds: Normal heart sounds.   Pulmonary:      Effort: Pulmonary effort is normal. No respiratory distress.      Breath sounds: Normal breath sounds.   Abdominal:      General: Bowel sounds are normal. There is no distension.      Palpations: Abdomen is soft.      Tenderness: There is no abdominal tenderness.   Skin:     General: Skin is warm and dry.      Coloration: Skin is not jaundiced.      Findings: No rash.   Neurological:      General: No focal deficit present.      Mental Status: She is alert and oriented to person, place, and time.      Cranial Nerves: No cranial nerve deficit.   Psychiatric:         Mood and Affect: Mood normal.         Behavior: Behavior normal.         Thought Content: Thought content normal.     No visits with results within 2 Month(s) from this visit.   Latest known visit with results is:   Office Visit on 03/22/2023   Component Date Value Ref Range Status    A-1 Antitrypsin 03/22/2023 158  101 - 187 mg/dL Final    CLAIRE Direct 03/22/2023 Negative  Negative Final    Smooth Muscle Ab 03/22/2023 10  0 - 19 Units Final    Ceruloplasmin 03/22/2023 31.4  19.0 - 39.0 mg/dL Final    GGT 03/22/2023 184 (H)  0 - 60 IU/L Final     Hemochromatosis Gene 03/22/2023 Comment   Final    Hep A IgM 03/22/2023 Negative  Negative Final    Hepatitis B Surface Ag 03/22/2023 Negative  Negative Final    Hep B Core IgM 03/22/2023 Negative  Negative Final    Hepatitis C Ab 03/22/2023 Non Reactive  Non Reactive Final    Mitochondrial Ab 03/22/2023 <20.0  0.0 - 20.0 Units Final    WBC 03/22/2023 5.9  3.4 - 10.8 x10E3/uL Final    RBC 03/22/2023 4.86  3.77 - 5.28 x10E6/uL Final    Hemoglobin 03/22/2023 15.1  11.1 - 15.9 g/dL Final    Hematocrit 03/22/2023 44.6  34.0 - 46.6 % Final    MCV 03/22/2023 92  79 - 97 fL Final    MCH 03/22/2023 31.1  26.6 - 33.0 pg Final    MCHC 03/22/2023 33.9  31.5 - 35.7 g/dL Final    RDW 03/22/2023 13.0  11.7 - 15.4 % Final    Platelets 03/22/2023 212  150 - 450 x10E3/uL Final    Neutrophil Rel % 03/22/2023 54  Not Estab. % Final    Lymphocyte Rel % 03/22/2023 37  Not Estab. % Final    Monocyte Rel % 03/22/2023 6  Not Estab. % Final    Eosinophil Rel % 03/22/2023 2  Not Estab. % Final    Basophil Rel % 03/22/2023 1  Not Estab. % Final    Neutrophils Absolute 03/22/2023 3.2  1.4 - 7.0 x10E3/uL Final    Lymphocytes Absolute 03/22/2023 2.2  0.7 - 3.1 x10E3/uL Final    Monocytes Absolute 03/22/2023 0.3  0.1 - 0.9 x10E3/uL Final    Eosinophils Absolute 03/22/2023 0.1  0.0 - 0.4 x10E3/uL Final    Basophils Absolute 03/22/2023 0.1  0.0 - 0.2 x10E3/uL Final    Immature Granulocyte Rel % 03/22/2023 0  Not Estab. % Final    Immature Grans Absolute 03/22/2023 0.0  0.0 - 0.1 x10E3/uL Final    Glucose 03/22/2023 143 (H)  70 - 99 mg/dL Final    BUN 03/22/2023 10  8 - 27 mg/dL Final    Creatinine 03/22/2023 0.74  0.57 - 1.00 mg/dL Final    EGFR Result 03/22/2023 87  >59 mL/min/1.73 Final    BUN/Creatinine Ratio 03/22/2023 14  12 - 28 Final    Sodium 03/22/2023 142  134 - 144 mmol/L Final    Potassium 03/22/2023 4.5  3.5 - 5.2 mmol/L Final    Chloride 03/22/2023 96  96 - 106 mmol/L Final    Total CO2 03/22/2023 27   20 - 29 mmol/L Final    Calcium 2023 9.7  8.7 - 10.3 mg/dL Final    Total Protein 2023 7.1  6.0 - 8.5 g/dL Final    Albumin 2023 4.5  3.8 - 4.8 g/dL Final    Globulin 2023 2.6  1.5 - 4.5 g/dL Final    A/G Ratio 2023 1.7  1.2 - 2.2 Final    Total Bilirubin 2023 0.6  0.0 - 1.2 mg/dL Final    Alkaline Phosphatase 2023 113  44 - 121 IU/L Final    AST (SGOT) 2023 71 (H)  0 - 40 IU/L Final    ALT (SGPT) 2023 85 (H)  0 - 32 IU/L Final    Fibrosis Score (References) 2023 0.26 (H)  0.00 - 0.21 Final    Fibrosis Stage (Reference) 2023 F0-F1   Final    Steatosis Score (Reference) 2023 0.91 (H)  0.00 - 0.30 Final    Steatosis Grade (Reference) 2023 Comment   Final    HILL Score (Reference) 2023 0.75 (H)  0.25 Final    Hill Grade (Reference) 2023 Comment   Final    Height: (Reference) 2023 63  in Final    Weight: (Reference) 2023 199  LBS Final    Alpha 2-Macroglobulins, Qn 2023 159  110 - 276 mg/dL Final    Haptoglobin 2023 158  37 - 355 mg/dL Final    Apolipoprotein A-1 2023 175  116 - 209 mg/dL Final    Total Bilirubin 2023 0.5  0.0 - 1.2 mg/dL Final    GGT 2023 189 (H)  0 - 60 IU/L Final    ALT (SGPT) 2023 89 (H)  0 - 40 IU/L Final    AST (SGOT) P5P (Reference) 2023 82 (H)  0 - 40 IU/L Final    Cholesterol, Total (Reference) 2023 155  100 - 199 mg/dL Final    Glucose, Serum (Reference) 2023 137 (H)  70 - 99 mg/dL Final    Triglycerides 2023 260 (H)  0 - 149 mg/dL Final    Interpretations: (Reference) 2023 Comment   Final    Fibrosis Scorin2023 Comment   Final    Steatosis Grading (Reference) 2023 Comment   Final    Hill Scoring (Reference) 2023 Comment   Final    Limitations: (Reference) 2023 Comment   Final    Comment (Reference) 2023 Comment   Final    TSH 2023 2.660  0.450 - 4.500  uIU/mL Final    Gliadin Deamidated Peptide Ab, IgA 03/22/2023 4  0 - 19 units Final    Deaminated Gliadin Ab IgG 03/22/2023 2  0 - 19 units Final    Tissue Transglutaminase IgA 03/22/2023 <2  0 - 3 U/mL Final    Tissue Transglutaminase IgG 03/22/2023 <2  0 - 5 U/mL Final    Endomysial IgA 03/22/2023 Negative  Negative Final    IgA 03/22/2023 200  87 - 352 mg/dL Final    INR 03/22/2023 0.9  0.9 - 1.2 Final    Protime 03/22/2023 9.8  9.1 - 12.0 sec Final    Interpretation 03/22/2023 Comment   Final       Diagnoses and all orders for this visit:    1. Elevated liver function tests (Primary)  -     Obtain Informed Consent; Standing  -     CT Needle Biopsy Liver; Future  -     Tissue Pathology Exam; Standing      Patient 73-year-old female with history of hyperlipidemia, hypertension, hyperglycemia noted with persistently elevated LFTs.  Patient has undergone serologic work-up which has been negative for specific findings ultrasound did confirm fatty liver.  Now the question to differentiate nonalcoholic steatohepatitis from benign fatty liver patient will recommend liver biopsy.  Patient agreed to proceed and will await results of the biopsy for further recommendations on therapy.

## 2023-08-27 DIAGNOSIS — E11.40 TYPE 2 DIABETES MELLITUS WITH DIABETIC NEUROPATHY, WITHOUT LONG-TERM CURRENT USE OF INSULIN: ICD-10-CM

## 2023-08-28 ENCOUNTER — OFFICE VISIT (OUTPATIENT)
Dept: FAMILY MEDICINE CLINIC | Facility: CLINIC | Age: 73
End: 2023-08-28
Payer: MEDICARE

## 2023-08-28 VITALS
DIASTOLIC BLOOD PRESSURE: 82 MMHG | HEART RATE: 71 BPM | WEIGHT: 197.3 LBS | BODY MASS INDEX: 34.96 KG/M2 | SYSTOLIC BLOOD PRESSURE: 121 MMHG | HEIGHT: 63 IN | OXYGEN SATURATION: 97 % | TEMPERATURE: 97.4 F

## 2023-08-28 DIAGNOSIS — F41.1 GENERALIZED ANXIETY DISORDER: ICD-10-CM

## 2023-08-28 DIAGNOSIS — R53.83 FATIGUE, UNSPECIFIED TYPE: ICD-10-CM

## 2023-08-28 DIAGNOSIS — E11.9 TYPE 2 DIABETES MELLITUS WITHOUT COMPLICATION, WITHOUT LONG-TERM CURRENT USE OF INSULIN: ICD-10-CM

## 2023-08-28 DIAGNOSIS — G62.9 NEUROPATHY: ICD-10-CM

## 2023-08-28 DIAGNOSIS — Z00.00 ENCOUNTER FOR SUBSEQUENT ANNUAL WELLNESS VISIT (AWV) IN MEDICARE PATIENT: Primary | ICD-10-CM

## 2023-08-28 DIAGNOSIS — F51.01 PRIMARY INSOMNIA: ICD-10-CM

## 2023-08-28 DIAGNOSIS — I10 PRIMARY HYPERTENSION: ICD-10-CM

## 2023-08-28 DIAGNOSIS — E78.2 MIXED HYPERLIPIDEMIA: ICD-10-CM

## 2023-08-28 LAB
ACYLCARNITINE/C0 UR-RTO: ABNORMAL {RATIO}
EXPIRATION DATE: ABNORMAL
Lab: ABNORMAL
POC CREATININE URINE: ABNORMAL
POC MICROALBUMIN URINE: ABNORMAL

## 2023-08-28 RX ORDER — GABAPENTIN 300 MG/1
CAPSULE ORAL
Qty: 120 CAPSULE | Refills: 2 | Status: SHIPPED | OUTPATIENT
Start: 2023-08-28

## 2023-08-28 NOTE — TELEPHONE ENCOUNTER
Rx Refill Note  Requested Prescriptions     Pending Prescriptions Disp Refills    gabapentin (NEURONTIN) 300 MG capsule [Pharmacy Med Name: GABAPENTIN 300 MG CAPSULE] 120 capsule      Sig: TAKE ONE CAPSULE BY MOUTH EVERY MORNING AND TAKE TWO CAPSULES BY MOUTH EVERY EVENING - MAY TAKE AN ADDITIONAL CAPSULE IN THE AFTERNOON AS NEEDED      Last office visit with prescribing clinician: 5/22/2023    Next office visit with prescribing clinician: 8/28/2023     Reyes Calhoun CMA  08/28/23, 08:11 EDT

## 2023-08-28 NOTE — PATIENT INSTRUCTIONS
Continue your current medications.   Aim for 150 minutes of aerobic exercise weekly. Walking even a few minutes and gradually increasing is a good start.   Keep mammogram as scheduled.  CT chest to screen for lung cancer due in 12/2023.  Colonoscopy is up to date til 2027.   Yearly eye exam and daily feet checks encouraged.  Routine dental exams also encouraged.  See GI and neurologist as scheduled.

## 2023-08-28 NOTE — PROGRESS NOTES
The ABCs of the Annual Wellness Visit  Subsequent Medicare Wellness Visit    Subjective    Dana Cerda is a 73 y.o. female who presents for a Subsequent Medicare Wellness Visit.  She is single and lives at home with her mom who she helps to care for.  She is retired from [employer]/pay[employer]. She has history of left breast cancer and had left mastectomy in 2012. Her mammogram on right is done yearly. Her next is scheduled for tomorrow. Her colonoscopy is up to date. She had in 2017 and again in 2020 due to bleeding. It was recommended that she recheck in 2027. She is a former smoker. She quit 5 years ago. Her last chest CT to screen for lung cancer was 12/2022 at [facility].       The following portions of the patient's history were reviewed and   updated as appropriate: allergies, current medications, past family history, past medical history, past social history, past surgical history, and problem list.    Compared to one year ago, the patient feels her physical   health is worse. Her physical activity is limited by her neuropathy.     Compared to one year ago, the patient feels her mental   health is worse. She feels more fatigue and lack of initiative. Caring for her mom who has early dementia has also caused increased stress.     Recent Hospitalizations:  She was not admitted to the hospital during the last year.       Current Medical Providers:  Patient Care Team:  Anel Jeronimo MD as PCP - General (Family Medicine)    Outpatient Medications Prior to Visit   Medication Sig Dispense Refill    ALPRAZolam (XANAX) 0.5 MG tablet Take 1 tablet by mouth Daily As Needed for Anxiety. 30 tablet 0    B COMPLEX-BIOTIN-FA PO Take  by mouth.      clonazePAM (KlonoPIN) 1 MG tablet TAKE ONE TABLET BY MOUTH DAILY AS NEEDED FOR ANXIETY 30 tablet 2    empagliflozin (Jardiance) 10 MG tablet tablet Take 1 tablet by mouth Daily. 90 tablet 1    meloxicam (MOBIC) 15 MG tablet       nortriptyline (PAMELOR) 25 MG capsule TAKE ONE  "CAPSULE BY MOUTH ONCE NIGHTLY 90 capsule 1    Omega-3 Fatty Acids (fish oil) 1000 MG capsule capsule Take 1 capsule by mouth.      omeprazole OTC (PriLOSEC OTC) 20 MG EC tablet Take 1 tablet by mouth Daily.      propranolol (INDERAL) 40 MG tablet Take 1 tablet by mouth 2 (Two) Times a Day. 180 tablet 1    simvastatin (ZOCOR) 40 MG tablet Take 1 tablet by mouth Daily. 90 tablet 2    gabapentin (NEURONTIN) 300 MG capsule TAKE ONE CAPSULE BY MOUTH EVERY MORNING AND TAKE TWO CAPSULES BY MOUTH EVERY EVENING -MAY TAKE AN ADDITIONAL CAPSULE IN AFTERNOON IF NEEDED 120 capsule 0    naproxen (NAPROSYN) 500 MG tablet Take 1 tablet by mouth. (Patient not taking: Reported on 8/28/2023)       No facility-administered medications prior to visit.       No opioid medication identified on active medication list. I have reviewed chart for other potential  high risk medication/s and harmful drug interactions in the elderly.        Aspirin is not on active medication list.  Aspirin use is not indicated based on review of current medical condition/s. Risk of harm outweighs potential benefits.  .    Patient Active Problem List   Diagnosis    Neuropathy    Type 2 diabetes mellitus without complication, without long-term current use of insulin    Mixed hyperlipidemia    Generalized anxiety disorder    Primary insomnia    Primary hypertension     Advance Care Planning   Advance Care Planning     Advance Directive is not on file.  ACP discussion was held with the patient during this visit. Patient has an advance directive (not in EMR), copy requested.     Objective    Vitals:    08/28/23 1325   BP: 121/82   BP Location: Right arm   Patient Position: Sitting   Cuff Size: Adult   Pulse: 71   Temp: 97.4 øF (36.3 øC)   TempSrc: Oral   SpO2: 97%   Weight: 89.5 kg (197 lb 4.8 oz)   Height: 160 cm (62.99\")     Estimated body mass index is 34.96 kg/mý as calculated from the following:    Height as of this encounter: 160 cm (62.99\").    Weight as of " this encounter: 89.5 kg (197 lb 4.8 oz).    BMI is >= 30 and <35. (Class 1 Obesity). The following options were offered after discussion;: exercise counseling/recommendations and nutrition counseling/recommendations      Does the patient have evidence of cognitive impairment? No          HEALTH RISK ASSESSMENT    Smoking Status:  Social History     Tobacco Use   Smoking Status Former    Packs/day: 1.00    Years: 40.00    Pack years: 40.00    Types: Cigarettes    Start date: 1967    Quit date: 2019    Years since quittin.6    Passive exposure: Past   Smokeless Tobacco Never     Alcohol Consumption:  Social History     Substance and Sexual Activity   Alcohol Use Not Currently    Comment: A beer or glass of wine occassionally...socially     Fall Risk Screen:    FRANCINE Fall Risk Assessment was completed, and patient is at LOW risk for falls.Assessment completed on:2023    Depression Screenin/28/2023     1:25 PM   PHQ-2/PHQ-9 Depression Screening   Little Interest or Pleasure in Doing Things 0-->not at all   Feeling Down, Depressed or Hopeless 0-->not at all   PHQ-9: Brief Depression Severity Measure Score 0       Health Habits and Functional and Cognitive Screenin/28/2023     1:22 PM   Functional & Cognitive Status   Do you have difficulty preparing food and eating? No   Do you have difficulty bathing yourself, getting dressed or grooming yourself? No   Do you have difficulty using the toilet? No   Do you have difficulty moving around from place to place? No   Do you have trouble with steps or getting out of a bed or a chair? No   Current Diet Well Balanced Diet   Dental Exam Up to date   Eye Exam Up to date   Exercise (times per week) 0 times per week   Current Exercises Include No Regular Exercise   Do you need help using the phone?  No   Are you deaf or do you have serious difficulty hearing?  Yes   Do you need help to go to places out of walking distance? No   Do you need help  shopping? No   Do you need help preparing meals?  No   Do you need help with housework?  No   Do you need help with laundry? No   Do you need help taking your medications? No   Do you need help managing money? No   Do you ever drive or ride in a car without wearing a seat belt? No   Have you felt unusual stress, anger or loneliness in the last month? Yes   Who do you live with? Other   If you need help, do you have trouble finding someone available to you? No   Do you have difficulty concentrating, remembering or making decisions? No       Age-appropriate Screening Schedule:  Refer to the list below for future screening recommendations based on patient's age, sex and/or medical conditions. Orders for these recommended tests are listed in the plan section. The patient has been provided with a written plan.    Health Maintenance   Topic Date Due    DXA SCAN  Never done    TDAP/TD VACCINES (1 - Tdap) Never done    ZOSTER VACCINE (2 of 3) 10/07/2014    COVID-19 Vaccine (3 - Pfizer series) 06/02/2021    DIABETIC FOOT EXAM  Never done    DIABETIC EYE EXAM  02/15/2023    HEMOGLOBIN A1C  08/22/2023    INFLUENZA VACCINE  10/01/2023    LUNG CANCER SCREENING  12/20/2023    LIPID PANEL  02/22/2024    MAMMOGRAM  08/01/2024    ANNUAL WELLNESS VISIT  08/28/2024    URINE MICROALBUMIN  08/28/2024    COLORECTAL CANCER SCREENING  02/14/2030    HEPATITIS C SCREENING  Completed    Pneumococcal Vaccine 65+  Completed                  CMS Preventative Services Quick Reference  Risk Factors Identified During Encounter  Chronic Pain:  Continue gabapentin and discuss other options with neurologist.   Immunizations Discussed/Encouraged: Prevnar 20 (Pneumococcal 20-valent conjugate), Shingrix, and COVID19  The above risks/problems have been discussed with the patient.  Pertinent information has been shared with the patient in the After Visit Summary.  An After Visit Summary and PPPS were made available to the patient.    Follow Up:   Next  Medicare Wellness visit to be scheduled in 1 year.       Additional E&M Note during same encounter follows:  Patient has multiple medical problems which are significant and separately identifiable that require additional work above and beyond the Medicare Wellness Visit.      Chief Complaint  Medicare Wellness-subsequent    Subjective        HPI  Dana Cerda is also being seen today for follow up of type 2 diabetes. Her last A1C was 7.2. She is currently on jardiance 10mg daily and is tolerating well. She monitors her sugars daily in AM and typically averages around 140.     She also has a history of neuropathy. The neuropathy preceeded her diabetes. She is on gabapentin 300mg am and 600 at night. On occasion she will take an afternoon dosage if pain is severe. Gabapentin is helpful but not 100%. She cannot tolerate higher dosing due to side effects.     She also has a history of hyperlipidemia. She is on simvastatin 40mg daily. She reports good compliance and tolerability. She is fasting for labs.     She is also here for follow up of anxiety and insomnia. She is on clonazepam and nortriptylline nightly. She also will take xanax on occasion for sleep/anxiety.     She has a history of hypertension and her blood pressure has been well controlled on propranolol. She is reluctant to change as it has been helpful.    She is seeing GI for fatty liver disease and is scheduled for liver biopsy.    Review of Systems   Constitutional:  Positive for fatigue.   HENT:  Negative for ear pain and sore throat.    Respiratory:  Negative for cough and shortness of breath.    Cardiovascular:  Negative for chest pain and palpitations.   Gastrointestinal:  Negative for abdominal pain, constipation and diarrhea.   Genitourinary:  Negative for dysuria.   Musculoskeletal:  Positive for arthralgias (most bothered by chronic feet pain).   Allergic/Immunologic: Negative for environmental allergies.   Neurological:  Negative for  "dizziness and light-headedness.   Psychiatric/Behavioral:  Negative for suicidal ideas. The patient is nervous/anxious.      Objective   Vital Signs:  /82 (BP Location: Right arm, Patient Position: Sitting, Cuff Size: Adult)   Pulse 71   Temp 97.4 øF (36.3 øC) (Oral)   Ht 160 cm (62.99\")   Wt 89.5 kg (197 lb 4.8 oz)   SpO2 97%   BMI 34.96 kg/mý     Physical Exam  Constitutional:       General: She is not in acute distress.  HENT:      Head: Normocephalic and atraumatic.      Mouth/Throat:      Mouth: Mucous membranes are moist.      Pharynx: No posterior oropharyngeal erythema.   Eyes:      Extraocular Movements: Extraocular movements intact.      Pupils: Pupils are equal, round, and reactive to light.   Cardiovascular:      Rate and Rhythm: Normal rate and regular rhythm.      Heart sounds: No murmur heard.  Pulmonary:      Effort: No respiratory distress.      Breath sounds: Normal breath sounds.   Abdominal:      General: There is no distension.      Palpations: Abdomen is soft.      Tenderness: There is no abdominal tenderness.   Musculoskeletal:      Right lower leg: No edema.      Left lower leg: No edema.   Skin:     Findings: No lesion.   Neurological:      General: No focal deficit present.      Mental Status: She is alert.                       Assessment and Plan   Diagnoses and all orders for this visit:    1. AWV Subs (Primary)    2. Mixed hyperlipidemia  -     Lipid Panel    3. Type 2 diabetes mellitus without complication, without long-term current use of insulin  -     Comprehensive Metabolic Panel  -     Hemoglobin A1c  -     POC Microalbumin    4. Neuropathy    5. Generalized anxiety disorder  -     TSH Rfx On Abnormal To Free T4    6. Primary insomnia    7. Fatigue, unspecified type  -     TSH Rfx On Abnormal To Free T4  -     CBC & Differential    8. Primary hypertension  -     Comprehensive Metabolic Panel    Other orders  -     Pneumococcal Conjugate Vaccine 20-Valent " All    Continue your current medications.   Aim for 150 minutes of aerobic exercise weekly. Walking even a few minutes and gradually increasing is a good start.   Keep mammogram as scheduled.  CT chest to screen for lung cancer due in 12/2023.  Colonoscopy is up to date til 2027.   Yearly eye exam and daily feet checks encouraged.  Routine dental exams also encouraged.  See GI and neurologist as scheduled.    Driss reviewed and was appropriate.   We discussed changing to ACE/ARB given diabetes but wanted to wait until liver biopsy.         Follow Up   Return in about 3 months (around 11/28/2023) for Recheck.  Patient was given instructions and counseling regarding her condition or for health maintenance advice. Please see specific information pulled into the AVS if appropriate.

## 2023-08-29 ENCOUNTER — APPOINTMENT (OUTPATIENT)
Dept: WOMENS IMAGING | Facility: HOSPITAL | Age: 73
End: 2023-08-29
Payer: MEDICARE

## 2023-08-29 LAB
ALBUMIN SERPL-MCNC: 4.6 G/DL (ref 3.8–4.8)
ALBUMIN/GLOB SERPL: 1.6 {RATIO} (ref 1.2–2.2)
ALP SERPL-CCNC: 105 IU/L (ref 44–121)
ALT SERPL-CCNC: 91 IU/L (ref 0–32)
AST SERPL-CCNC: 90 IU/L (ref 0–40)
BASOPHILS # BLD AUTO: 0.1 X10E3/UL (ref 0–0.2)
BASOPHILS NFR BLD AUTO: 1 %
BILIRUB SERPL-MCNC: 0.6 MG/DL (ref 0–1.2)
BUN SERPL-MCNC: 9 MG/DL (ref 8–27)
BUN/CREAT SERPL: 13 (ref 12–28)
CALCIUM SERPL-MCNC: 9.9 MG/DL (ref 8.7–10.3)
CHLORIDE SERPL-SCNC: 97 MMOL/L (ref 96–106)
CHOLEST SERPL-MCNC: 167 MG/DL (ref 100–199)
CO2 SERPL-SCNC: 21 MMOL/L (ref 20–29)
CREAT SERPL-MCNC: 0.67 MG/DL (ref 0.57–1)
EGFRCR SERPLBLD CKD-EPI 2021: 93 ML/MIN/1.73
EOSINOPHIL # BLD AUTO: 0.2 X10E3/UL (ref 0–0.4)
EOSINOPHIL NFR BLD AUTO: 3 %
ERYTHROCYTE [DISTWIDTH] IN BLOOD BY AUTOMATED COUNT: 12.8 % (ref 11.7–15.4)
GLOBULIN SER CALC-MCNC: 2.9 G/DL (ref 1.5–4.5)
GLUCOSE SERPL-MCNC: 137 MG/DL (ref 70–99)
HBA1C MFR BLD: 7.6 % (ref 4.8–5.6)
HCT VFR BLD AUTO: 44.8 % (ref 34–46.6)
HDLC SERPL-MCNC: 56 MG/DL
HGB BLD-MCNC: 15.3 G/DL (ref 11.1–15.9)
IMM GRANULOCYTES # BLD AUTO: 0 X10E3/UL (ref 0–0.1)
IMM GRANULOCYTES NFR BLD AUTO: 0 %
LDLC SERPL CALC-MCNC: 64 MG/DL (ref 0–99)
LYMPHOCYTES # BLD AUTO: 2.3 X10E3/UL (ref 0.7–3.1)
LYMPHOCYTES NFR BLD AUTO: 33 %
MCH RBC QN AUTO: 31.7 PG (ref 26.6–33)
MCHC RBC AUTO-ENTMCNC: 34.2 G/DL (ref 31.5–35.7)
MCV RBC AUTO: 93 FL (ref 79–97)
MONOCYTES # BLD AUTO: 0.4 X10E3/UL (ref 0.1–0.9)
MONOCYTES NFR BLD AUTO: 5 %
NEUTROPHILS # BLD AUTO: 4.1 X10E3/UL (ref 1.4–7)
NEUTROPHILS NFR BLD AUTO: 58 %
PLATELET # BLD AUTO: 212 X10E3/UL (ref 150–450)
POTASSIUM SERPL-SCNC: 4.6 MMOL/L (ref 3.5–5.2)
PROT SERPL-MCNC: 7.5 G/DL (ref 6–8.5)
RBC # BLD AUTO: 4.82 X10E6/UL (ref 3.77–5.28)
SODIUM SERPL-SCNC: 139 MMOL/L (ref 134–144)
TRIGL SERPL-MCNC: 300 MG/DL (ref 0–149)
TSH SERPL DL<=0.005 MIU/L-ACNC: 1.51 UIU/ML (ref 0.45–4.5)
VLDLC SERPL CALC-MCNC: 47 MG/DL (ref 5–40)
WBC # BLD AUTO: 7.1 X10E3/UL (ref 3.4–10.8)

## 2023-08-29 PROCEDURE — 77067 SCR MAMMO BI INCL CAD: CPT | Performed by: RADIOLOGY

## 2023-08-29 PROCEDURE — 77063 BREAST TOMOSYNTHESIS BI: CPT | Performed by: RADIOLOGY

## 2023-08-30 DIAGNOSIS — F41.1 GENERALIZED ANXIETY DISORDER: ICD-10-CM

## 2023-08-30 NOTE — TELEPHONE ENCOUNTER
Rx Refill Note  Requested Prescriptions     Pending Prescriptions Disp Refills    meloxicam (MOBIC) 15 MG tablet [Pharmacy Med Name: MELOXICAM 15 MG TABLET] 30 tablet 0     Sig: TAKE ONE TABLET BY MOUTH EVERY DAY ROUTINELY FOR 1 WEEK AND THEN AS NEEDED    clonazePAM (KlonoPIN) 1 MG tablet [Pharmacy Med Name: CLONAZEPAM 1 MG TABLET] 30 tablet 0     Sig: TAKE 1 TABLET BY MOUTH DAILY AS NEEDED FOR ANXIETY      Last office visit with prescribing clinician: 8/28/2023   Last telemedicine visit with prescribing clinician: Visit date not found   Next office visit with prescribing clinician: 11/29/2023                         Would you like a call back once the refill request has been completed: [] Yes [] No    If the office needs to give you a call back, can they leave a voicemail: [] Yes [] No    Regi Esqueda MA  08/30/23, 11:34 EDT

## 2023-09-03 RX ORDER — MELOXICAM 15 MG/1
TABLET ORAL
Qty: 30 TABLET | Refills: 2 | Status: SHIPPED | OUTPATIENT
Start: 2023-09-03

## 2023-09-03 RX ORDER — CLONAZEPAM 1 MG/1
TABLET ORAL
Qty: 30 TABLET | Refills: 2 | Status: SHIPPED | OUTPATIENT
Start: 2023-09-03

## 2023-09-12 ENCOUNTER — HOSPITAL ENCOUNTER (OUTPATIENT)
Dept: CT IMAGING | Facility: HOSPITAL | Age: 73
Discharge: HOME | End: 2023-09-12
Payer: MEDICARE

## 2023-09-12 VITALS
RESPIRATION RATE: 14 BRPM | BODY MASS INDEX: 34.91 KG/M2 | SYSTOLIC BLOOD PRESSURE: 106 MMHG | DIASTOLIC BLOOD PRESSURE: 83 MMHG | OXYGEN SATURATION: 93 % | WEIGHT: 197 LBS | HEIGHT: 63 IN | TEMPERATURE: 98 F | HEART RATE: 80 BPM

## 2023-09-12 DIAGNOSIS — R79.89 ELEVATED LIVER FUNCTION TESTS: ICD-10-CM

## 2023-09-12 LAB
INR PPP: 0.9 (ref 0.8–1.2)
PLATELET # BLD AUTO: 176 10*3/MM3 (ref 140–450)
PROTHROMBIN TIME: 11.4 SECONDS (ref 12.8–15.2)

## 2023-09-12 PROCEDURE — 25010000002 FENTANYL CITRATE (PF) 50 MCG/ML SOLUTION: Performed by: RADIOLOGY

## 2023-09-12 PROCEDURE — 25010000002 MIDAZOLAM PER 1 MG: Performed by: RADIOLOGY

## 2023-09-12 PROCEDURE — 88307 TISSUE EXAM BY PATHOLOGIST: CPT | Performed by: INTERNAL MEDICINE

## 2023-09-12 PROCEDURE — 88313 SPECIAL STAINS GROUP 2: CPT | Performed by: INTERNAL MEDICINE

## 2023-09-12 PROCEDURE — 77012 CT SCAN FOR NEEDLE BIOPSY: CPT

## 2023-09-12 PROCEDURE — 99152 MOD SED SAME PHYS/QHP 5/>YRS: CPT

## 2023-09-12 PROCEDURE — 85049 AUTOMATED PLATELET COUNT: CPT | Performed by: RADIOLOGY

## 2023-09-12 PROCEDURE — 85610 PROTHROMBIN TIME: CPT

## 2023-09-12 RX ORDER — SODIUM CHLORIDE 0.9 % (FLUSH) 0.9 %
10 SYRINGE (ML) INJECTION AS NEEDED
Status: DISCONTINUED | OUTPATIENT
Start: 2023-09-12 | End: 2023-09-13 | Stop reason: HOSPADM

## 2023-09-12 RX ORDER — SODIUM CHLORIDE 0.9 % (FLUSH) 0.9 %
3 SYRINGE (ML) INJECTION EVERY 12 HOURS SCHEDULED
Status: DISCONTINUED | OUTPATIENT
Start: 2023-09-12 | End: 2023-09-13 | Stop reason: HOSPADM

## 2023-09-12 RX ORDER — SODIUM CHLORIDE 9 MG/ML
25 INJECTION, SOLUTION INTRAVENOUS ONCE
Status: COMPLETED | OUTPATIENT
Start: 2023-09-12 | End: 2023-09-12

## 2023-09-12 RX ORDER — MIDAZOLAM HYDROCHLORIDE 1 MG/ML
0.5 INJECTION INTRAMUSCULAR; INTRAVENOUS ONCE
Status: COMPLETED | OUTPATIENT
Start: 2023-09-12 | End: 2023-09-12

## 2023-09-12 RX ORDER — SODIUM CHLORIDE 9 MG/ML
40 INJECTION, SOLUTION INTRAVENOUS AS NEEDED
Status: DISCONTINUED | OUTPATIENT
Start: 2023-09-12 | End: 2023-09-13 | Stop reason: HOSPADM

## 2023-09-12 RX ORDER — LIDOCAINE HYDROCHLORIDE 10 MG/ML
20 INJECTION, SOLUTION INFILTRATION; PERINEURAL ONCE
Status: DISCONTINUED | OUTPATIENT
Start: 2023-09-12 | End: 2023-09-13 | Stop reason: HOSPADM

## 2023-09-12 RX ORDER — FENTANYL CITRATE 50 UG/ML
INJECTION, SOLUTION INTRAMUSCULAR; INTRAVENOUS AS NEEDED
Status: COMPLETED | OUTPATIENT
Start: 2023-09-12 | End: 2023-09-12

## 2023-09-12 RX ORDER — MIDAZOLAM HYDROCHLORIDE 1 MG/ML
INJECTION INTRAMUSCULAR; INTRAVENOUS AS NEEDED
Status: COMPLETED | OUTPATIENT
Start: 2023-09-12 | End: 2023-09-12

## 2023-09-12 RX ADMIN — MIDAZOLAM 0.5 MG: 1 INJECTION INTRAMUSCULAR; INTRAVENOUS at 13:48

## 2023-09-12 RX ADMIN — FENTANYL CITRATE 25 MCG: 50 INJECTION INTRAMUSCULAR; INTRAVENOUS at 13:48

## 2023-09-12 RX ADMIN — SODIUM CHLORIDE 25 ML/HR: 9 INJECTION, SOLUTION INTRAVENOUS at 13:44

## 2023-09-12 RX ADMIN — MIDAZOLAM 0.5 MG: 1 INJECTION INTRAMUSCULAR; INTRAVENOUS at 13:28

## 2023-09-12 RX ADMIN — FENTANYL CITRATE 25 MCG: 50 INJECTION INTRAMUSCULAR; INTRAVENOUS at 13:59

## 2023-09-12 RX ADMIN — FENTANYL CITRATE 25 MCG: 50 INJECTION INTRAMUSCULAR; INTRAVENOUS at 13:52

## 2023-09-12 NOTE — DISCHARGE INSTRUCTIONS
EDUCATION /DISCHARGE INSTRUCTIONS  CT/US guided biopsy:  A biopsy is a procedure done to remove tissue for further analysis.  Before images are taken to locate the target area.  Images can be obtained using ultrasound, CT or MRI.  A physician will clean your skin with antiseptic soap, place a sterile towel around the site and administer a local anesthetic to numb the area.  The physician will then insert a special needle.  Sometimes images are taken of the needle after it is inserted to ensure the needle is in the correct area to be biopsied.   A sample is obtained and sent to the laboratory for study.  Occasionally the laboratory is unable to make a diagnosis from the sample and the procedure may need to be repeated.  Within a week the radiologist will send a report to your physician.  A pathologist will also examine the tissue and send a report.    Risks of the procedure include but are not limited to:   *  Bleeding    *  Infection   *  Puncture of surrounding organs *  Death     *  Lung collapse if the biopsy is near the chest which may require insertion of a      chest tube to re-inflate the lung if severe.    Benefits of the procedure:  Using x-ray helps to locate the area that requires a biopsy. The procedure is less invasive than a surgical procedure, there are no large incisions and it does not require anesthesia.    Alternatives to the procedure:  A biopsy can be performed surgically.  Risks of a surgical biopsy include exposure to anesthesia, infection, excessive bleeding and injury to abdominal organs.  A benefit of surgical biopsy is the ability to see the area to be biopsied and remove of a larger piece of tissue.    THIS EDUCATION INFORMATION WAS REVIEWED PRIOR TO PROCEDURE AND CONSENT. Patient initials__________________Time___________________    Post Procedure:    *  Expect the biopsy site may be tender up to one week.    *  Rest today (no pushing pulling or straining).   *  Slowly increase activity  tomorrow.    *  If you received sedation do not drive for 24 hours.   *  Keep dressing clean and dry.   *  Leave dressing on puncture site for 24 hours.    *  You may shower when dressing removed.  Call your doctor if experiencing:   *  Signs of infection such as redness, swelling, excessive pain and / or foul        smelling drainage from the puncture site.   *  Chills or fever over 101 degrees (by mouth).   *  Unrelieved pain.   *  Any new or severe symptoms.   *  If experiencing sudden / severe shortness of breath or chest pain go to the       nearest emergency room.   Following the procedure:     Follow-up with the ordering physician as directed.    Continue to take other medications as directed by your physician unless    otherwise instructed.       If you have any concerns please call the Radiology Nurses Desk at [phone].  You are the most important factor in your recovery.  Follow the above instructions carefully.

## 2023-09-12 NOTE — NURSING NOTE
Pt D/Vadim via W/C per Suzanne SILVERMAN to go home. Pt's family to drive pt home.    NO sx or symptoms of distress noted. No c/o voiced.

## 2023-09-12 NOTE — POST-PROCEDURE NOTE
POST PROCEDURE NOTE    Procedure: liver biopsy    Pre-Procedure Diagnosis: elev.LFTs    Post-procedure Diagnosis: same    Findings: successful bx, gelfoam used    Complications: none    Blood loss: min    Specimen Removed: 2x18G cores    Disposition:   Discharge home

## 2023-09-13 ENCOUNTER — TELEPHONE (OUTPATIENT)
Dept: INTERVENTIONAL RADIOLOGY/VASCULAR | Facility: HOSPITAL | Age: 73
End: 2023-09-13
Payer: MEDICARE

## 2023-09-13 LAB
LAB AP CASE REPORT: NORMAL
LAB AP CLINICAL INFORMATION: NORMAL
LAB AP DIAGNOSIS COMMENT: NORMAL
LAB AP INTRADEPARTMENTAL CONSULT: NORMAL
PATH REPORT.FINAL DX SPEC: NORMAL
PATH REPORT.GROSS SPEC: NORMAL

## 2023-09-19 RX ORDER — PROPRANOLOL HYDROCHLORIDE 40 MG/1
TABLET ORAL
Qty: 60 TABLET | Refills: 0 | Status: SHIPPED | OUTPATIENT
Start: 2023-09-19

## 2023-11-12 RX ORDER — PROPRANOLOL HYDROCHLORIDE 40 MG/1
40 TABLET ORAL 2 TIMES DAILY
Qty: 60 TABLET | Refills: 0 | Status: SHIPPED | OUTPATIENT
Start: 2023-11-12

## 2023-11-20 ENCOUNTER — TELEPHONE (OUTPATIENT)
Dept: GASTROENTEROLOGY | Facility: CLINIC | Age: 73
End: 2023-11-20
Payer: MEDICARE

## 2023-11-20 DIAGNOSIS — R79.89 ELEVATED LIVER FUNCTION TESTS: Primary | ICD-10-CM

## 2023-11-20 NOTE — TELEPHONE ENCOUNTER
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  Begin vitamin E 800 units daily and follow-up in 3 months with repeat CMP.  Patient should follow-up office in 6 months

## 2023-11-27 ENCOUNTER — TELEPHONE (OUTPATIENT)
Dept: GASTROENTEROLOGY | Facility: CLINIC | Age: 73
End: 2023-11-27
Payer: MEDICARE

## 2023-11-27 NOTE — TELEPHONE ENCOUNTER
Caller: Dana Cerda    Relationship: Self    Best call back number: [phone]    What is the best time to reach you: ANYTIME    Who are you requesting to speak with (clinical staff, provider,  specific staff member): CLINICAL     What was the call regarding: PT IS NEEDING TO HAVE HER LABS SCHEDULED BEFORE HER 6 MONTH FOLLOW UP IN FEB 27, 2024. PT REQUESTED TO SEE IF LABS CAN BE DONE SAME DAY OF HER APPT.     Is it okay if the provider responds through [employer]: YES

## 2023-11-29 ENCOUNTER — OFFICE VISIT (OUTPATIENT)
Dept: FAMILY MEDICINE CLINIC | Facility: CLINIC | Age: 73
End: 2023-11-29
Payer: MEDICARE

## 2023-11-29 VITALS
BODY MASS INDEX: 34.61 KG/M2 | WEIGHT: 195.3 LBS | HEIGHT: 63 IN | DIASTOLIC BLOOD PRESSURE: 77 MMHG | OXYGEN SATURATION: 97 % | SYSTOLIC BLOOD PRESSURE: 118 MMHG | HEART RATE: 67 BPM

## 2023-11-29 DIAGNOSIS — F41.1 GENERALIZED ANXIETY DISORDER: ICD-10-CM

## 2023-11-29 DIAGNOSIS — G62.9 NEUROPATHY: Primary | ICD-10-CM

## 2023-11-29 DIAGNOSIS — E11.40 TYPE 2 DIABETES MELLITUS WITH DIABETIC NEUROPATHY, WITHOUT LONG-TERM CURRENT USE OF INSULIN: ICD-10-CM

## 2023-11-29 PROCEDURE — 3051F HG A1C>EQUAL 7.0%<8.0%: CPT | Performed by: FAMILY MEDICINE

## 2023-11-29 PROCEDURE — 99214 OFFICE O/P EST MOD 30 MIN: CPT | Performed by: FAMILY MEDICINE

## 2023-11-29 PROCEDURE — 1160F RVW MEDS BY RX/DR IN RCRD: CPT | Performed by: FAMILY MEDICINE

## 2023-11-29 PROCEDURE — 3074F SYST BP LT 130 MM HG: CPT | Performed by: FAMILY MEDICINE

## 2023-11-29 PROCEDURE — 1159F MED LIST DOCD IN RCRD: CPT | Performed by: FAMILY MEDICINE

## 2023-11-29 PROCEDURE — 3078F DIAST BP <80 MM HG: CPT | Performed by: FAMILY MEDICINE

## 2023-11-29 RX ORDER — SIMVASTATIN 20 MG
TABLET ORAL
COMMUNITY

## 2023-11-29 RX ORDER — VITAMIN E 268 MG
800 CAPSULE ORAL DAILY
COMMUNITY

## 2023-11-29 RX ORDER — CLONAZEPAM 1 MG/1
1 TABLET ORAL DAILY PRN
Qty: 30 TABLET | Refills: 2 | Status: SHIPPED | OUTPATIENT
Start: 2023-11-29

## 2023-11-29 RX ORDER — GABAPENTIN 300 MG/1
CAPSULE ORAL
Qty: 120 CAPSULE | Refills: 2 | Status: SHIPPED | OUTPATIENT
Start: 2023-11-29

## 2023-11-29 NOTE — PATIENT INSTRUCTIONS
Continue your current medications.   Klonopin and gabapentin are controlled medication. This medication has the risk of dependence and addiction. Use the medication only as directed. You will be required to be seen every 90 days and have routine drug screening and Driss monitoring while on the medication. If you stop the medication, be sure to dispose of the medication properly.    Follow up with neurologist and GI as scheduled.  Plan recheck and fasting labs in 3 months.

## 2023-11-29 NOTE — PROGRESS NOTES
"Chief Complaint  Diabetes    Subjective    History of Present Illness {CC  Problem List  Visit  Diagnosis   Encounters  Notes  Medications  Labs  Result Review Imaging  Media :23}     Dana Cerda presents to [facility] for Diabetes.     She presents for follow up of neuropathy. She is currently on gabapentin 300 in am and 600 at night. She will occasionally take additional 300 in afternoon also but if she increases higher, she has increased drowsiness. She is scheduled to follow up with her neurologist in early February. She has a history of diabetes but her neuropathy proceeded her diabetes. Her last A1C was 7.6 and her jardiance was increased to 25.     She is also here for follow up of anxiety. She is currently on klonopin nightly and on rare occasion will use xanax as needed. Her last prescription of xanax #30 was 6 months ago and she has some remaining.       Objective     Vital Signs:   /77   Pulse 67   Ht 160 cm (63\")   Wt 88.6 kg (195 lb 4.8 oz)   SpO2 97%   BMI 34.60 kg/m²   Body mass index is 34.6 kg/m².     Physical Exam  Constitutional:       General: She is not in acute distress.  Cardiovascular:      Rate and Rhythm: Normal rate and regular rhythm.      Heart sounds: No murmur heard.  Pulmonary:      Effort: No respiratory distress.      Breath sounds: Normal breath sounds.   Neurological:      General: No focal deficit present.      Mental Status: She is alert.          Result Review  Data Reviewed:{ Labs  Result Review  Imaging  Med Tab  Media :23}                Assessment and Plan {CC Problem List  Visit Diagnosis  ROS  Review (Popup)  Health Maintenance  Quality  BestPractice  Medications  SmartSets  SnapShot Encounters  Media :23}   Diagnoses and all orders for this visit:    1. Neuropathy (Primary)    2. Generalized anxiety disorder  -     clonazePAM (KlonoPIN) 1 MG tablet; Take 1 tablet by mouth Daily As Needed for " Anxiety. for anxiety  Dispense: 30 tablet; Refill: 2    3. Type 2 diabetes mellitus with diabetic neuropathy, without long-term current use of insulin  -     gabapentin (NEURONTIN) 300 MG capsule; TAKE ONE CAPSULE BY MOUTH EVERY MORNING AND TAKE TWO CAPSULES BY MOUTH EVERY EVENING - MAY TAKE AN ADDITIONAL CAPSULE IN THE AFTERNOON AS NEEDED  Dispense: 120 capsule; Refill: 2        Patient Instructions   Continue your current medications.   Klonopin and gabapentin are controlled medication. This medication has the risk of dependence and addiction. Use the medication only as directed. You will be required to be seen every 90 days and have routine drug screening and Driss monitoring while on the medication. If you stop the medication, be sure to dispose of the medication properly.    Follow up with neurologist and GI as scheduled.  Plan recheck and fasting labs in 3 months.      Patient was given instructions and counseling regarding her condition or for health maintenance advice on the AVS.       No follow-ups on file.    Anel Jeronimo MD

## 2023-12-19 RX ORDER — PROPRANOLOL HYDROCHLORIDE 40 MG/1
40 TABLET ORAL 2 TIMES DAILY
Qty: 60 TABLET | Refills: 0 | Status: SHIPPED | OUTPATIENT
Start: 2023-12-19

## 2023-12-19 NOTE — TELEPHONE ENCOUNTER
Caller: [facility] [id number] [place], [place] - [address]  [facility] [phone] [facility] [phone] FX    Relationship: Pharmacy    Best call back number: [phone]    Requested Prescriptions:   Requested Prescriptions     Pending Prescriptions Disp Refills    propranolol (INDERAL) 40 MG tablet 60 tablet 0     Sig: Take 1 tablet by mouth 2 (Two) Times a Day.        Pharmacy where request should be sent: [facility] [id number] [place] [place] [facility] [address] [facility]  [facility] [phone] [facility] [phone] FX     Last office visit with prescribing clinician: 11/29/2023   Last telemedicine visit with prescribing clinician: Visit date not found   Next office visit with prescribing clinician: 3/4/2024     Additional details provided by patient: CALLER STATED THAT PATIENT INFORMED THEM THEY ARE OUT OF THE MEDICATION    Does the patient have less than a 3 day supply:  [x] Yes  [] No    Would you like a call back once the refill request has been completed: [] Yes [x] No    If the office needs to give you a call back, can they leave a voicemail: [] Yes [x] No    Karson Rae Rep   12/19/23 16:28 EST

## 2024-01-02 NOTE — TELEPHONE ENCOUNTER
Rx Refill Note  Requested Prescriptions     Pending Prescriptions Disp Refills    OneTouch Verio test strip [Pharmacy Med Name: ONETOUCH VERIO TEST STRIP]       Sig: TEST BLOOD SUGAR DAILY AND AS NEEDED    Lancets (OneTouch Delica Plus [id number]) misc [Pharmacy Med Name: ONETOUCH DELICA PLUS 33G LANCT] 100 each      Sig: TEST DAILY AND AS NEEDED      Last office visit with prescribing clinician: 11/29/2023   Next office visit with prescribing clinician: 3/4/2024     Regi Esqueda MA  01/02/24, 10:26 EST

## 2024-01-03 RX ORDER — BLOOD SUGAR DIAGNOSTIC
1 STRIP MISCELLANEOUS SEE ADMIN INSTRUCTIONS
Qty: 50 EACH | Refills: 5 | Status: SHIPPED | OUTPATIENT
Start: 2024-01-03

## 2024-01-03 RX ORDER — LANCETS 33 GAUGE
EACH MISCELLANEOUS
Qty: 100 EACH | Refills: 5 | Status: SHIPPED | OUTPATIENT
Start: 2024-01-03

## 2024-01-21 RX ORDER — PROPRANOLOL HYDROCHLORIDE 40 MG/1
40 TABLET ORAL 2 TIMES DAILY
Qty: 60 TABLET | Refills: 1 | Status: SHIPPED | OUTPATIENT
Start: 2024-01-21

## 2024-02-22 RX ORDER — SIMVASTATIN 40 MG
TABLET ORAL
Qty: 30 TABLET | Refills: 2 | Status: SHIPPED | OUTPATIENT
Start: 2024-02-22

## 2024-02-22 NOTE — TELEPHONE ENCOUNTER
Rx Refill Note  Requested Prescriptions     Pending Prescriptions Disp Refills    simvastatin (ZOCOR) 40 MG tablet [Pharmacy Med Name: SIMVASTATIN 40 MG TABLET] 30 tablet 2     Sig: TAKE ONE TABLET BY MOUTH DAILY      Last office visit with prescribing clinician: 11/29/2023   Next office visit with prescribing clinician: 3/4/2024     Regi Esqueda MA  02/22/24, 08:03 EST

## 2024-02-25 RX ORDER — EMPAGLIFLOZIN 25 MG/1
25 TABLET, FILM COATED ORAL DAILY
Qty: 90 TABLET | Refills: 1 | Status: SHIPPED | OUTPATIENT
Start: 2024-02-25

## 2024-02-27 ENCOUNTER — LAB (OUTPATIENT)
Dept: GASTROENTEROLOGY | Facility: CLINIC | Age: 74
End: 2024-02-27
Payer: MEDICARE

## 2024-02-27 ENCOUNTER — OFFICE VISIT (OUTPATIENT)
Dept: GASTROENTEROLOGY | Facility: CLINIC | Age: 74
End: 2024-02-27
Payer: MEDICARE

## 2024-02-27 VITALS
BODY MASS INDEX: 35.15 KG/M2 | DIASTOLIC BLOOD PRESSURE: 75 MMHG | RESPIRATION RATE: 16 BRPM | HEIGHT: 63 IN | TEMPERATURE: 97.7 F | SYSTOLIC BLOOD PRESSURE: 109 MMHG | OXYGEN SATURATION: 93 % | HEART RATE: 79 BPM | WEIGHT: 198.4 LBS

## 2024-02-27 DIAGNOSIS — K75.81 NASH (NONALCOHOLIC STEATOHEPATITIS): Primary | ICD-10-CM

## 2024-02-27 PROCEDURE — 1160F RVW MEDS BY RX/DR IN RCRD: CPT | Performed by: INTERNAL MEDICINE

## 2024-02-27 PROCEDURE — 1159F MED LIST DOCD IN RCRD: CPT | Performed by: INTERNAL MEDICINE

## 2024-02-27 PROCEDURE — 3074F SYST BP LT 130 MM HG: CPT | Performed by: INTERNAL MEDICINE

## 2024-02-27 PROCEDURE — 99212 OFFICE O/P EST SF 10 MIN: CPT | Performed by: INTERNAL MEDICINE

## 2024-02-27 PROCEDURE — 3078F DIAST BP <80 MM HG: CPT | Performed by: INTERNAL MEDICINE

## 2024-02-27 RX ORDER — PREGABALIN 25 MG/1
CAPSULE ORAL
COMMUNITY
Start: 2024-02-07

## 2024-02-27 NOTE — PROGRESS NOTES
Chief Complaint   Patient presents with   • elevated liver function test       Dana Cerda is a  73 y.o. female here for a follow up visit for nonalcoholic steatohepatitis.    HPI    Patient 73-year-old female with history of hyperlipidemia, hypertension, diabetes with history of breast cancer here for follow-up of nonalcoholic steatohepatitis.  Patient with minimal fibrosis noted on biopsy last year at low risk for rapid progression started on vitamin E will await results of CMP from earlier today.  Patient denies any GI complaints.  No bleeding diathesis no pedal edema issues of chronic neuropathy likely related to the diabetes currently being assessed by neurology.    Past Medical History:   Diagnosis Date   • Abnormal blood finding    • Abnormal finding on radiological examination of breast     calcifications   • Abnormal results of liver function studies    • Abnormal weight gain    • Acute bronchitis, unspecified    • Acute bronchospasm    • Allergic urticaria    • Anxiety state    • Back pain    • Blood in stool    • Breast cancer in situ    • Constipation, unspecified    • Cough    • Diarrhea, unspecified    • Diverticulitis of large intestine without perforation or abscess without bleeding    • Dysuria    • Edema    • Essential (primary) hypertension    • Fatty liver    • Fracture of radius, distal, closed    • SHIRA (generalized anxiety disorder)    • Hematuria    • Hyperglycemia    • Hyperlipidemia    • Infection of nail bed of finger of right hand     right index finger   • Irritable bowel syndrome    • Left lower quadrant pain    • Left shoulder pain     with adhesive capsulitis   • Liver disease    • Lump or mass in breast     solid on left   • Myalgia    • Neuropathy    • Paresthesia    • Sleep disorder, unspecified    • Tobacco use    • Type 2 diabetes mellitus    • 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 for anxiety, Disp: 30 tablet, Rfl: 2  •  glucose blood (OneTouch Verio) test strip, TEST BLOOD SUGAR DAILY AND AS NEEDED, Disp: 50 each, Rfl: 5  •  Jardiance 25 MG tablet tablet, TAKE 1 TABLET BY MOUTH DAILY, Disp: 90 tablet, Rfl: 1  •  Lancets (OneTouch Delica Plus Paapmp44O) misc, TEST DAILY AND AS NEEDED, Disp: 100 each, Rfl: 5  •  meloxicam (MOBIC) 15 MG tablet, TAKE ONE TABLET BY MOUTH EVERY DAY ROUTINELY FOR 1 WEEK AND THEN AS NEEDED, Disp: 30 tablet, Rfl: 2  •  nortriptyline (PAMELOR) 25 MG capsule, TAKE ONE CAPSULE BY MOUTH ONCE NIGHTLY, Disp: 90 capsule, Rfl: 1  •  Omega-3 Fatty Acids (fish oil) 1000 MG capsule capsule, Take 1 capsule by mouth., Disp: , Rfl:   •  omeprazole OTC (PriLOSEC OTC) 20 MG EC tablet, Take 1 tablet by mouth Daily., Disp: , Rfl:   •  pregabalin (LYRICA) 25 MG capsule, , Disp: , Rfl:   •  propranolol (INDERAL) 40 MG tablet, TAKE 1 TABLET BY MOUTH TWICE A DAY, Disp: 60 tablet, Rfl: 1  •  simvastatin (ZOCOR) 40 MG tablet, TAKE ONE TABLET BY MOUTH DAILY, Disp: 30 tablet, Rfl: 2  •  VITAMIN E 400 UNIT capsule, Take 2 capsules by mouth Daily., Disp: , Rfl:   •  gabapentin (NEURONTIN) 300 MG capsule, TAKE ONE CAPSULE BY MOUTH EVERY MORNING AND TAKE TWO CAPSULES BY MOUTH EVERY EVENING - MAY TAKE AN ADDITIONAL CAPSULE IN THE AFTERNOON AS NEEDED (Patient not taking: Reported on 2/27/2024), Disp: 120 capsule, Rfl: 2    Allergies   Allergen Reactions   • Iodinated Contrast Media Shortness Of Breath     PT STATES HAD THROAT SWELLING WITH CT SCAN    • Penicillins Other (See Comments)     STATES WAS 12 YRS AND SHUT DOWN WHOLE BODY PER PT   • Cefaclor Nausea And Vomiting       Social History      Socioeconomic History   • Marital status:    • Number of children: 4   Tobacco Use   • Smoking status: Former     Packs/day: 1.00     Years: 40.00     Additional pack years: 0.00     Total pack years: 40.00     Types: Cigarettes     Start date: 1967     Quit date: 2019     Years since quittin.1     Passive exposure: Past   • Smokeless tobacco: Never   Vaping Use   • Vaping Use: Never used   Substance and Sexual Activity   • Alcohol use: Not Currently     Comment: A beer or glass of wine occassionally...socially   • Drug use: Never   • Sexual activity: Not Currently     Partners: Male     Comment: hysterectomy       Family History   Problem Relation Age of Onset   • Hyperlipidemia Mother    • Irritable bowel syndrome Mother    • Thyroid cancer Maternal Aunt    • Colon cancer Maternal Aunt    • Alcohol abuse Sister        Review of Systems   Constitutional: Negative.    Respiratory: Negative.     Cardiovascular: Negative.    Gastrointestinal: Negative.    Skin: Negative.    Neurological:  Positive for numbness.   Hematological: Negative.      Vitals:    24 1431   BP: 109/75   Pulse: 79   Resp: 16   Temp: 97.7 °F (36.5 °C)   SpO2: 93%       Physical Exam  Vitals reviewed.   Constitutional:       Appearance: Normal appearance. She is well-developed.   HENT:      Head: Normocephalic and atraumatic.      Mouth/Throat:      Mouth: Mucous membranes are moist.   Eyes:      General: No scleral icterus.     Pupils: Pupils are equal, round, and reactive to light.   Pulmonary:      Effort: Pulmonary effort is normal. No respiratory distress.      Breath sounds: Normal breath sounds.   Abdominal:      General: Bowel sounds are normal. There is no distension.      Palpations: Abdomen is soft.      Tenderness: There is no abdominal tenderness.   Skin:     General: Skin is warm and dry.      Coloration: Skin is not jaundiced.      Findings: No rash.   Neurological:      General: No focal deficit present.       Mental Status: She is alert and oriented to person, place, and time.      Cranial Nerves: No cranial nerve deficit.   Psychiatric:         Mood and Affect: Mood normal.         Behavior: Behavior normal.         Thought Content: Thought content normal.     No visits with results within 2 Month(s) from this visit.   Latest known visit with results is:   Hospital Outpatient Visit on 09/12/2023   Component Date Value Ref Range Status   • Platelets 09/12/2023 176  140 - 450 10*3/mm3 Final   • Protime 09/12/2023 11.4 (L)  12.8 - 15.2 seconds Final   • INR 09/12/2023 0.9  0.8 - 1.2 Final   • Case Report 09/12/2023    Final                    Value:Surgical Pathology Report                         Case: [id number]                                  Authorizing Provider:  Tremayne Kinney MD    Collected:           09/12/2023 02:00 PM          Ordering Location:     [facility]  Received:            09/12/2023 02:23 PM                                 CT                                                                           Pathologist:           Kavya Pak MD                                                          Specimen:    Liver, Liver bx                                                                           • Clinical Information 09/12/2023    Final                    Value:This result contains rich text formatting which cannot be displayed here.   • Final Diagnosis 09/12/2023    Final                    Value:This result contains rich text formatting which cannot be displayed here.   • Comment 09/12/2023    Final                    Value:This result contains rich text formatting which cannot be displayed here.   • Intradepartmental Consult 09/12/2023    Final                    Value:This result contains rich text formatting which cannot be displayed here.   • Gross Description 09/12/2023    Final                    Value:This result contains rich text formatting which cannot be  displayed here.       Diagnoses and all orders for this visit:    1. HILL (nonalcoholic steatohepatitis) (Primary)    Patient 73-year-old female with history of type 2 diabetes, hypertension, breast cancer with nonalcoholic steatohepatitis here for follow-up.  Patient with only minimal fibrosis noted on biopsy last year here for follow-up.  Patient denies any bleeding diathesis only real complaints is her neuropathy discomfort.  Patient started on vitamin E had lab tests repeated, CMP this morning so still waiting results.  Will follow-up on labs with plan on follow-up office visit in 6 months for imaging and monitor clinically.

## 2024-03-04 ENCOUNTER — OFFICE VISIT (OUTPATIENT)
Dept: FAMILY MEDICINE CLINIC | Facility: CLINIC | Age: 74
End: 2024-03-04
Payer: MEDICARE

## 2024-03-04 VITALS
WEIGHT: 199 LBS | DIASTOLIC BLOOD PRESSURE: 64 MMHG | SYSTOLIC BLOOD PRESSURE: 126 MMHG | OXYGEN SATURATION: 95 % | HEIGHT: 63 IN | HEART RATE: 75 BPM | BODY MASS INDEX: 35.26 KG/M2

## 2024-03-04 DIAGNOSIS — I10 PRIMARY HYPERTENSION: ICD-10-CM

## 2024-03-04 DIAGNOSIS — F41.1 GENERALIZED ANXIETY DISORDER: ICD-10-CM

## 2024-03-04 DIAGNOSIS — E78.2 MIXED HYPERLIPIDEMIA: ICD-10-CM

## 2024-03-04 DIAGNOSIS — G62.9 NEUROPATHY: ICD-10-CM

## 2024-03-04 DIAGNOSIS — R79.89 ELEVATED LIVER FUNCTION TESTS: ICD-10-CM

## 2024-03-04 DIAGNOSIS — E11.9 TYPE 2 DIABETES MELLITUS WITHOUT COMPLICATION, WITHOUT LONG-TERM CURRENT USE OF INSULIN: Primary | ICD-10-CM

## 2024-03-04 RX ORDER — CLONAZEPAM 1 MG/1
1 TABLET ORAL DAILY PRN
Qty: 30 TABLET | Refills: 2 | Status: SHIPPED | OUTPATIENT
Start: 2024-03-04

## 2024-03-04 NOTE — PROGRESS NOTES
"Chief Complaint  Diabetes, Hyperlipidemia, Hypertension, and Anxiety    Subjective    History of Present Illness {CC  Problem List  Visit  Diagnosis   Encounters  Notes  Medications  Labs  Result Review Imaging  Media :23}     Dana Cerda presents to [facility] for Diabetes, Hyperlipidemia, Hypertension, and Anxiety.  History of Present Illness     She presents for follow up of diabetes. She is currently on jardaince 25mg daily. She checks her sugar each morning and it averages around 145. Her last A1C was 7.6 and she is due recheck. She was on metformin in the past and was unable to tolerate due to diarrhea.  She has never been on ozempic but is willing to start if not at goal.    She is also here for follow up of hypertension. She is stable on propranolol twice a day. She was started on these before she was diabetic. She reports that she has never been on an ACE or an ARB. She is doing well on current medication and prefers not to change or add new medication at this time.     She also has a history of hyperlipidemia. She is currently on simvastatin 40mg daily. She reports good compliance and tolerability of the medication. Her last LDL was 64 but triglycerides were high at 300.     She is also here for follow up of anxiety. She is stable on klonopin nightly. She also hasa xanax to use as needed but she rarely needs this and does not need a refill today.     She is now seeing neurologist for her neuropathy. She is scheduled to have EMG/NCS. Her gabapentin was stopped and she is now on lyrica. She is on 50 twice a day. At current dose, it isn't working as well.     She has a history of elevated liver function tests and has seen GI and had biopsy that was good, no evidence of cirrhosis. She recently had CMP through them and was stable.    Objective     Vital Signs:   /64   Pulse 75   Ht 160 cm (62.99\")   Wt 90.3 kg (199 lb)   SpO2 95%   BMI 35.26 kg/m²   Body " mass index is 35.26 kg/m².     Physical Exam  Constitutional:       General: She is not in acute distress.  Cardiovascular:      Rate and Rhythm: Normal rate and regular rhythm.      Heart sounds: No murmur heard.  Pulmonary:      Effort: No respiratory distress.      Breath sounds: Normal breath sounds.   Neurological:      General: No focal deficit present.      Mental Status: She is alert.   Psychiatric:         Behavior: Behavior normal.          Result Review  Data Reviewed:{ Labs  Result Review  Imaging  Med Tab  Media :23}                Assessment and Plan {CC Problem List  Visit Diagnosis  ROS  Review (Popup)  [facility] Maintenance  Quality  BestPractice  Medications  SmartSets  SnapShot Encounters  Media :23}   Diagnoses and all orders for this visit:    1. Type 2 diabetes mellitus without complication, without long-term current use of insulin (Primary)  -     Hemoglobin A1c    2. Generalized anxiety disorder  -     clonazePAM (KlonoPIN) 1 MG tablet; Take 1 tablet by mouth Daily As Needed for Anxiety. for anxiety  Dispense: 30 tablet; Refill: 2    3. Mixed hyperlipidemia    4. Primary hypertension    5. Neuropathy    6. Elevated liver function tests        Patient Instructions   Continue your current medications, healthy diet and exercise as tolerated.  You had lab tests today. You should receive a call or my chart message with your test results. If you have not received your results in the next 7-10 days, please contact the office.  If A1C is above 7, will add ozempic.   Klonopin is a controlled medication. This medication has the risk of dependence and addiction. Use the medication only as directed. You will be required to be seen every 90 days and have routine drug screening and Driss monitoring while on the medication. If you stop the medication, be sure to dispose of the medication properly.         Controlled medication agreement reviewed and signed.  Driss reviewed and was  appropriate.   Discussed adding low dose ACE or ARB but patient deferreed at this time.     Patient was given instructions and counseling regarding her condition or for health maintenance advice on the AVS.       Return in about 3 months (around 6/4/2024) for Recheck.    Anel Jeronimo MD

## 2024-03-05 LAB — HBA1C MFR BLD: 7.2 % (ref 4.8–5.6)

## 2024-03-05 RX ORDER — SEMAGLUTIDE 0.68 MG/ML
INJECTION, SOLUTION SUBCUTANEOUS
Qty: 3 ML | Refills: 3 | Status: SHIPPED | OUTPATIENT
Start: 2024-03-05

## 2024-03-17 RX ORDER — PROPRANOLOL HYDROCHLORIDE 40 MG/1
40 TABLET ORAL 2 TIMES DAILY
Qty: 60 TABLET | Refills: 2 | Status: SHIPPED | OUTPATIENT
Start: 2024-03-17

## 2024-03-19 ENCOUNTER — TELEPHONE (OUTPATIENT)
Dept: GASTROENTEROLOGY | Facility: CLINIC | Age: 74
End: 2024-03-19
Payer: MEDICARE

## 2024-03-19 DIAGNOSIS — I70.8 ATHEROSCLEROSIS OF OTHER ARTERIES: ICD-10-CM

## 2024-03-19 DIAGNOSIS — K75.81 NASH (NONALCOHOLIC STEATOHEPATITIS): Primary | ICD-10-CM

## 2024-03-19 DIAGNOSIS — R74.8 ELEVATED LIVER ENZYMES: ICD-10-CM

## 2024-03-19 NOTE — TELEPHONE ENCOUNTER
Per Dr. Kinney:     LFTs improved on vitamin E, recommend repeat CMP in 6 months along with [facility]roSure and liver ultrasound before visit.    Orders placed patient sent results via my chart.

## 2024-03-31 DIAGNOSIS — F41.1 GENERALIZED ANXIETY DISORDER: ICD-10-CM

## 2024-03-31 RX ORDER — ALPRAZOLAM 0.5 MG/1
0.5 TABLET ORAL DAILY PRN
Qty: 30 TABLET | Refills: 0 | Status: SHIPPED | OUTPATIENT
Start: 2024-03-31

## 2024-04-22 ENCOUNTER — TELEPHONE (OUTPATIENT)
Dept: GASTROENTEROLOGY | Facility: CLINIC | Age: 74
End: 2024-04-22
Payer: MEDICARE

## 2024-04-22 NOTE — TELEPHONE ENCOUNTER
----- Message from Dana Cerda sent at 4/19/2024  8:46 PM EDT -----  Regarding: Appointment Request  Contact: [phone]  Appointment Request From: Dana Cerda    With Provider: Tremayne Kinney [[facility]]    Preferred Date Range: 6/6/2024 – 6/10/2024    Preferred Times: Monday Afternoon, Tuesday Afternoon, Wednesday Afternoon, Thursday Afternoon, Friday Afternoon    Reason for visit: Follow-up    Comments:  Follow up on liver testing.

## 2024-05-20 RX ORDER — SIMVASTATIN 40 MG
40 TABLET ORAL DAILY
Qty: 30 TABLET | Refills: 2 | Status: SHIPPED | OUTPATIENT
Start: 2024-05-20

## 2024-06-04 DIAGNOSIS — F41.1 GENERALIZED ANXIETY DISORDER: ICD-10-CM

## 2024-06-05 ENCOUNTER — TELEPHONE (OUTPATIENT)
Dept: FAMILY MEDICINE CLINIC | Facility: CLINIC | Age: 74
End: 2024-06-05

## 2024-06-05 ENCOUNTER — OFFICE VISIT (OUTPATIENT)
Dept: FAMILY MEDICINE CLINIC | Facility: CLINIC | Age: 74
End: 2024-06-05
Payer: MEDICARE

## 2024-06-05 VITALS
HEIGHT: 63 IN | HEART RATE: 73 BPM | OXYGEN SATURATION: 94 % | SYSTOLIC BLOOD PRESSURE: 114 MMHG | WEIGHT: 197 LBS | DIASTOLIC BLOOD PRESSURE: 78 MMHG | BODY MASS INDEX: 34.91 KG/M2

## 2024-06-05 DIAGNOSIS — Z87.891 HISTORY OF NICOTINE DEPENDENCE: ICD-10-CM

## 2024-06-05 DIAGNOSIS — F41.1 GENERALIZED ANXIETY DISORDER: ICD-10-CM

## 2024-06-05 DIAGNOSIS — E11.40 TYPE 2 DIABETES MELLITUS WITH DIABETIC NEUROPATHY, WITHOUT LONG-TERM CURRENT USE OF INSULIN: Primary | ICD-10-CM

## 2024-06-05 PROCEDURE — 3074F SYST BP LT 130 MM HG: CPT | Performed by: FAMILY MEDICINE

## 2024-06-05 PROCEDURE — 3078F DIAST BP <80 MM HG: CPT | Performed by: FAMILY MEDICINE

## 2024-06-05 PROCEDURE — 99214 OFFICE O/P EST MOD 30 MIN: CPT | Performed by: FAMILY MEDICINE

## 2024-06-05 PROCEDURE — G2211 COMPLEX E/M VISIT ADD ON: HCPCS | Performed by: FAMILY MEDICINE

## 2024-06-05 PROCEDURE — 3051F HG A1C>EQUAL 7.0%<8.0%: CPT | Performed by: FAMILY MEDICINE

## 2024-06-05 RX ORDER — VALSARTAN 160 MG/1
160 TABLET ORAL DAILY
Qty: 90 TABLET | Refills: 1 | Status: SHIPPED | OUTPATIENT
Start: 2024-06-05

## 2024-06-05 RX ORDER — CLONAZEPAM 1 MG/1
1 TABLET ORAL DAILY PRN
Qty: 30 TABLET | Refills: 2 | Status: SHIPPED | OUTPATIENT
Start: 2024-06-05

## 2024-06-05 NOTE — PROGRESS NOTES
"Chief Complaint  Diabetes    Subjective    History of Present Illness {  Problem List  Visit  Diagnosis   Encounters  Notes  Medications  Labs  Result Review Imaging  Media :23}     Dana Cerda presents to [facility] for Diabetes.    She presents for follow up of diabetes. She is currently on jardiance 25 and ozempic. Her last A1C was 7.4 and ozempic was added. She has been on this for 5 weeks and is now up to 0.5mg weekly. She reports slight nausea initially and ongoing constipation.     She is also here for follow up of anxiety. She is currently on klonopin nightly routinely and also uses xanax on rare occasion as needed. #30 generally lasts more than 6 months. She does not need a refill today.     She has a history of neuropathy. It preceeded her diabetes by several years, however he is seeing neurologist and they feel her neuropathy is likely diabetic in nature. Her neurologist  changed her from gabapentin to pregabalin. She is currently on 50 twice daily. At current dosage it is not yet working as well as the gabapentin but so far she is tolerating the pregabalin better.     She is also here for follow up of hypertension. She has been stable on propranolol twice daily. She denies any chest pain or dizziness.     She is a former smoker and quit 1/2019 (5 years ago). She is due screening chest CT.     She also has a history of hyperlipidemia and is  on simvastatin. Her last cholesterol was 167 with LDL of 64. Triglycerides were high at 300/         Objective     Vital Signs:   /78   Pulse 73   Ht 160 cm (63\")   Wt 89.4 kg (197 lb)   SpO2 94%   BMI 34.90 kg/m²   Body mass index is 34.9 kg/m².     Physical Exam  Constitutional:       General: She is not in acute distress.  Cardiovascular:      Rate and Rhythm: Normal rate and regular rhythm.      Heart sounds: No murmur heard.  Pulmonary:      Effort: No respiratory distress.      Breath sounds: Normal " breath sounds.   Neurological:      General: No focal deficit present.      Mental Status: She is alert.   Psychiatric:         Behavior: Behavior normal.          Result Review  Data Reviewed:{ Labs  Result Review  Imaging  Med Tab  Media :23}                Assessment and Plan {CC Problem List  Visit Diagnosis  ROS  Review (Popup)  Health Maintenance  Quality  BestPractice  Medications  SmartSets  SnapShot Encounters  Media :23}   Diagnoses and all orders for this visit:    1. Type 2 diabetes mellitus with diabetic neuropathy, without long-term current use of insulin (Primary)    2. Generalized anxiety disorder    3. History of nicotine dependence  -      CT Chest Low Dose Cancer Screening WO; Future    Other orders  -     valsartan (Diovan) 160 MG tablet; Take 1 tablet by mouth Daily.  Dispense: 90 tablet; Refill: 1        Patient Instructions   Stop propranolol and change to valsaratn 160mg daily for hypertension. Since you are diabetic this medication is preferred because it also helps to protect your kidneys from diabetes.   Continue your other medications, healthy diet and exercise as tolerated.  Klonopin and xanax are controlled medication. They have the risk of dependence and addiction. Use the medication only as directed. You will be required to be seen every 90 days and have routine drug screening and Driss monitoring while on the medication. If you stop the medication, be sure to dispose of the medication properly.    Plan physical and full fasting labs at next visit.  Chest CT ordered to screen for lung cancer.    Driss reviewed and was appropriate.     Patient was given instructions and counseling regarding her condition or for health maintenance advice on the AVS.       Return in about 3 months (around 9/5/2024) for Annual, Medicare Wellness.    Anel Jeronimo MD       Answers submitted by the patient for this visit:  Primary Reason for Visit (Submitted on 5/29/2024)  What is  the primary reason for your visit?: Diabetes  Diabetes Questionnaire (Submitted on 5/29/2024)  Chief Complaint: Diabetes problem  Diabetes type: type 2  MedicAlert ID: No  Disease duration: 1 Years  Treatment compliance: all of the time  Symptom course: stable  Home blood tests: 1-2 x per day  High score: 140-180  Below 70: never  blurred vision: Yes  foot paresthesias: Yes  foot ulcerations: No  polydipsia: Yes  polyuria: Yes  weight loss: No  blackouts: No  hospitalization: No  nocturnal hypoglycemia: No  required assistance: No  required glucagon: No  confusion: No  headaches: No  speech difficulty: No  sweats: No  tremors: No  Current diet: generally healthy  Meal planning: avoidance of concentrated sweets  Exercise: intermittently  Eye exam current: No  Sees podiatrist: No

## 2024-06-05 NOTE — PATIENT INSTRUCTIONS
Stop propranolol and change to valsaratn 160mg daily for hypertension. Since you are diabetic this medication is preferred because it also helps to protect your kidneys from diabetes.   Continue your other medications, healthy diet and exercise as tolerated.  Klonopin and xanax are controlled medication. They have the risk of dependence and addiction. Use the medication only as directed. You will be required to be seen every 90 days and have routine drug screening and Driss monitoring while on the medication. If you stop the medication, be sure to dispose of the medication properly.    Plan physical and full fasting labs at next visit.  Chest CT ordered to screen for lung cancer.

## 2024-06-05 NOTE — TELEPHONE ENCOUNTER
Rx Refill Note  Requested Prescriptions     Pending Prescriptions Disp Refills    clonazePAM (KlonoPIN) 1 MG tablet [Pharmacy Med Name: clonazePAM 1 MG TABLET] 30 tablet      Sig: TAKE 1 TABLET BY MOUTH DAILY AS NEEDED FOR ANXIETY      Last office visit with prescribing clinician: 3/4/2024   Last telemedicine visit with prescribing clinician: Visit date not found   Next office visit with prescribing clinician: 6/5/2024       Sangeeta Clemente  06/05/24, 08:05 EDT

## 2024-06-05 NOTE — TELEPHONE ENCOUNTER
You wanted her to schedule her AWV with you in 3 months, but with her schedule and your schedule, it would be over the 3 month timeline. So I did it for 6 months out, is that OK?

## 2024-06-17 RX ORDER — PROPRANOLOL HYDROCHLORIDE 40 MG/1
40 TABLET ORAL 2 TIMES DAILY
Qty: 60 TABLET | Refills: 2 | Status: SHIPPED | OUTPATIENT
Start: 2024-06-17

## 2024-06-30 ENCOUNTER — HOSPITAL ENCOUNTER (OUTPATIENT)
Facility: HOSPITAL | Age: 74
Discharge: HOME | End: 2024-06-30
Admitting: FAMILY MEDICINE
Payer: MEDICARE

## 2024-06-30 DIAGNOSIS — Z87.891 HISTORY OF NICOTINE DEPENDENCE: ICD-10-CM

## 2024-06-30 PROCEDURE — 71271 CT THORAX LUNG CANCER SCR C-: CPT

## 2024-07-26 RX ORDER — SEMAGLUTIDE 0.68 MG/ML
INJECTION, SOLUTION SUBCUTANEOUS
Qty: 3 ML | Refills: 3 | Status: SHIPPED | OUTPATIENT
Start: 2024-07-26

## 2024-07-29 RX ORDER — MELOXICAM 15 MG/1
TABLET ORAL
Qty: 30 TABLET | Refills: 2 | Status: SHIPPED | OUTPATIENT
Start: 2024-07-29

## 2024-07-29 NOTE — TELEPHONE ENCOUNTER
Rx Refill Note  Requested Prescriptions     Pending Prescriptions Disp Refills    meloxicam (MOBIC) 15 MG tablet [Pharmacy Med Name: MELOXICAM 15 MG TABLET] 30 tablet 2     Sig: TAKE 1 TABLET BY MOUTH DAILY FOR ONE WEEK THEN TAKE 1 TABLET BY MOUTH DAILY AS NEEDED      Last office visit with prescribing clinician: 6/5/2024   Next office visit with prescribing clinician: 9/5/2024     Ramon Jorge MA  07/29/24, 08:06 EDT

## 2024-08-09 ENCOUNTER — TELEPHONE (OUTPATIENT)
Dept: GASTROENTEROLOGY | Facility: CLINIC | Age: 74
End: 2024-08-09
Payer: MEDICARE

## 2024-08-09 RX ORDER — NORTRIPTYLINE HYDROCHLORIDE 25 MG/1
CAPSULE ORAL
Qty: 90 CAPSULE | Refills: 1 | Status: SHIPPED | OUTPATIENT
Start: 2024-08-09

## 2024-08-09 NOTE — TELEPHONE ENCOUNTER
----- Message from [facility] [employer] sent at 8/9/2024 10:48 AM EDT -----  Regarding: Appointment Cancellation Request  Contact: [phone]  Dana Cerda would like to cancel the following appointments:    Paty Gutierrez in [facility] ([phone]), 8/20/2024  1:30 PM    Comments:

## 2024-08-19 RX ORDER — SIMVASTATIN 40 MG
40 TABLET ORAL DAILY
Qty: 30 TABLET | Refills: 2 | Status: SHIPPED | OUTPATIENT
Start: 2024-08-19

## 2024-08-26 NOTE — TELEPHONE ENCOUNTER
Rx Refill Note  Requested Prescriptions     Pending Prescriptions Disp Refills    empagliflozin (Jardiance) 25 MG tablet tablet 90 tablet 1     Sig: Take 1 tablet by mouth Daily.      Last office visit with prescribing clinician: 6/5/2024   Next office visit with prescribing clinician: 9/5/2024     Ramon Jorge MA  08/26/24, 09:33 EDT

## 2024-09-05 ENCOUNTER — OFFICE VISIT (OUTPATIENT)
Dept: FAMILY MEDICINE CLINIC | Facility: CLINIC | Age: 74
End: 2024-09-05
Payer: MEDICARE

## 2024-09-05 VITALS
HEIGHT: 63 IN | OXYGEN SATURATION: 95 % | TEMPERATURE: 97.6 F | HEART RATE: 89 BPM | RESPIRATION RATE: 16 BRPM | BODY MASS INDEX: 34.09 KG/M2 | SYSTOLIC BLOOD PRESSURE: 91 MMHG | DIASTOLIC BLOOD PRESSURE: 67 MMHG | WEIGHT: 192.4 LBS

## 2024-09-05 DIAGNOSIS — I10 PRIMARY HYPERTENSION: ICD-10-CM

## 2024-09-05 DIAGNOSIS — R79.89 ELEVATED LIVER FUNCTION TESTS: ICD-10-CM

## 2024-09-05 DIAGNOSIS — E78.2 MIXED HYPERLIPIDEMIA: ICD-10-CM

## 2024-09-05 DIAGNOSIS — E11.40 TYPE 2 DIABETES MELLITUS WITH DIABETIC NEUROPATHY, WITHOUT LONG-TERM CURRENT USE OF INSULIN: ICD-10-CM

## 2024-09-05 DIAGNOSIS — F41.1 GENERALIZED ANXIETY DISORDER: ICD-10-CM

## 2024-09-05 DIAGNOSIS — Z00.00 ENCOUNTER FOR SUBSEQUENT ANNUAL WELLNESS VISIT (AWV) IN MEDICARE PATIENT: Primary | ICD-10-CM

## 2024-09-05 RX ORDER — VALSARTAN 80 MG/1
80 TABLET ORAL DAILY
Qty: 90 TABLET | Refills: 1 | Status: SHIPPED | OUTPATIENT
Start: 2024-09-05

## 2024-09-05 RX ORDER — CIPROFLOXACIN 500 MG/1
500 TABLET, FILM COATED ORAL 2 TIMES DAILY
COMMUNITY
Start: 2024-09-01

## 2024-09-05 RX ORDER — CLONAZEPAM 1 MG/1
1 TABLET ORAL DAILY PRN
Qty: 30 TABLET | Refills: 2 | Status: SHIPPED | OUTPATIENT
Start: 2024-09-05

## 2024-09-05 NOTE — ASSESSMENT & PLAN NOTE
Continue clonazepam nightly as needed.  This is a controlled medication and has risk of dependence and should only be taken as directed.  It requires routine monitoring.  Driss was reviewed and was appropriate.

## 2024-09-05 NOTE — PROGRESS NOTES
Subjective   The ABCs of the Annual Wellness Visit  Medicare Wellness Visit      Dana Cerda is a 74 y.o. patient who presents for a Medicare Wellness Visit.  She is single and lives at home with her mom. She is retired from payroll. Her last mammogram was 1 year ago. She will call to schedule. Her last complete colonoscopy was 2017 and did not show any polyps. She was told to recheck in 10 years.  She had incomplete colonoscopy in 2020 for evaluation of rectal bleeding and it showed hemorrhoids. She is a former smoker and quit January 2019. Her last chest CT was 6/2024 and was stable, next screening was recommended in 1 year.     The following portions of the patient's history were reviewed and   updated as appropriate: allergies, current medications, past family history, past medical history, past social history, past surgical history, and problem list.    Compared to one year ago, the patient's physical   health is the same.  Compared to one year ago, the patient's mental   health is the same.    Recent Hospitalizations:  She was not admitted to the hospital during the last year.     Current Medical Providers:  Patient Care Team:  Anel Jeronimo MD as PCP - General (Family Medicine)  Tremayne Kinney MD as Consulting Physician (Gastroenterology)  Lambert Hernandez MD as Consulting Physician (Orthopedic Surgery)  Jimy Tovar MD (Neurology)  [facility]    Outpatient Medications Prior to Visit   Medication Sig Dispense Refill    ALPRAZolam (XANAX) 0.5 MG tablet TAKE 1 TABLET BY MOUTH DAILY AS NEEDED FOR ANXIETY 30 tablet 0    B COMPLEX-BIOTIN-FA PO Take  by mouth.      ciprofloxacin (CIPRO) 500 MG tablet Take 1 tablet by mouth 2 (Two) Times a Day.      Cranberry-Vitamin C-Probiotic (AZO CRANBERRY PO) Take  by mouth.      empagliflozin (Jardiance) 25 MG tablet tablet Take 1 tablet by mouth Daily. 90 tablet 1    glucose blood (OneTouch Verio) test strip TEST BLOOD SUGAR DAILY AND  AS NEEDED 50 each 5    Iron Combinations (IRON COMPLEX PO) Take  by mouth.      Lancets (OneTouch Delica Plus Elopll34N) misc TEST DAILY AND AS NEEDED 100 each 5    meloxicam (MOBIC) 15 MG tablet TAKE 1 TABLET BY MOUTH DAILY FOR ONE WEEK THEN TAKE 1 TABLET BY MOUTH DAILY AS NEEDED 30 tablet 2    nortriptyline (PAMELOR) 25 MG capsule TAKE ONE CAPSULE BY MOUTH ONCE NIGHTLY 90 capsule 1    Omega-3 Fatty Acids (fish oil) 1000 MG capsule capsule Take 1 capsule by mouth.      omeprazole OTC (PriLOSEC OTC) 20 MG EC tablet Take 1 tablet by mouth Daily.      pregabalin (LYRICA) 25 MG capsule       Semaglutide,0.25 or 0.5MG/DOS, (Ozempic, 0.25 or 0.5 MG/DOSE,) 2 MG/3ML solution pen-injector DIAL AND INJECT UNDER THE SKIN 0.25 MG WEEKLY FOR 4 WEEKS THEN DIAL AND INJECT 0.5 MG WEEKLY THEREAFTER 3 mL 3    simvastatin (ZOCOR) 40 MG tablet TAKE 1 TABLET BY MOUTH DAILY 30 tablet 2    VITAMIN E 400 UNIT capsule Take 2 capsules by mouth Daily.      clonazePAM (KlonoPIN) 1 MG tablet TAKE 1 TABLET BY MOUTH DAILY AS NEEDED FOR ANXIETY 30 tablet 2    valsartan (Diovan) 160 MG tablet Take 1 tablet by mouth Daily. 90 tablet 1    propranolol (INDERAL) 40 MG tablet TAKE 1 TABLET BY MOUTH TWICE A DAY 60 tablet 2     No facility-administered medications prior to visit.     No opioid medication identified on active medication list. I have reviewed chart for other potential  high risk medication/s and harmful drug interactions in the elderly.      Aspirin is not on active medication list.  Aspirin use is not indicated based on review of current medical condition/s. Risk of harm outweighs potential benefits.  .    Patient Active Problem List   Diagnosis    Neuropathy    Type 2 diabetes mellitus without complication, without long-term current use of insulin    Mixed hyperlipidemia    Generalized anxiety disorder    Primary insomnia    Primary hypertension    HILL (nonalcoholic steatohepatitis)     Advance Care Planning Advance Directive is not on  "file.  ACP discussion was held with the patient during this visit. Patient does not have an advance directive, information provided.            Objective   Vitals:    24 1252   BP: 91/67   Pulse: 89   Resp: 16   Temp: 97.6 °F (36.4 °C)   SpO2: 95%   Weight: 87.3 kg (192 lb 6.4 oz)   Height: 160 cm (63\")       Estimated body mass index is 34.08 kg/m² as calculated from the following:    Height as of this encounter: 160 cm (63\").    Weight as of this encounter: 87.3 kg (192 lb 6.4 oz).            Does the patient have evidence of cognitive impairment? No                                                                                                Health  Risk Assessment    Smoking Status:  Social History     Tobacco Use   Smoking Status Former    Current packs/day: 0.00    Average packs/day: 1 pack/day for 52.0 years (52.0 ttl pk-yrs)    Types: Cigarettes    Start date: 1967    Quit date: 2019    Years since quittin.6    Passive exposure: Past   Smokeless Tobacco Never     Alcohol Consumption:  Social History     Substance and Sexual Activity   Alcohol Use Not Currently    Comment: A beer or glass of wine occassionally...socially       Fall Risk Screen  STEADI Fall Risk Assessment was completed, and patient is at MODERATE risk for falls. Assessment completed on:2024    Depression Screenin/5/2024    12:58 PM   PHQ-2/PHQ-9 Depression Screening   Little Interest or Pleasure in Doing Things 0-->not at all   Feeling Down, Depressed or Hopeless 0-->not at all   PHQ-9: Brief Depression Severity Measure Score 0     Health Habits and Functional and Cognitive Screenin/5/2024    12:57 PM   Functional & Cognitive Status   Do you have difficulty preparing food and eating? No   Do you have difficulty bathing yourself, getting dressed or grooming yourself? No   Do you have difficulty using the toilet? No   Do you have difficulty moving around from place to place? No   Do you have trouble with " steps or getting out of a bed or a chair? No   Current Diet Well Balanced Diet   Dental Exam Not up to date   Eye Exam Up to date   Exercise (times per week) 7 times per week   Current Exercises Include Walking   Do you need help using the phone?  No   Are you deaf or do you have serious difficulty hearing?  Yes   Do you need help to go to places out of walking distance? No   Do you need help shopping? No   Do you need help preparing meals?  No   Do you need help with housework?  No   Do you need help with laundry? No   Do you need help taking your medications? No   Do you need help managing money? No   Do you ever drive or ride in a car without wearing a seat belt? No   Have you felt unusual stress, anger or loneliness in the last month? Yes   Who do you live with? Other   If you need help, do you have trouble finding someone available to you? No   Have you been bothered in the last four weeks by sexual problems? No   Do you have difficulty concentrating, remembering or making decisions? No         She reports tinnitus for years and is stable.  She reports situational increased stress with dealing with home plumbing issues.  Age-appropriate Screening Schedule:  Refer to the list below for future screening recommendations based on patient's age, sex and/or medical conditions. Orders for these recommended tests are listed in the plan section. The patient has been provided with a written plan.    Health Maintenance List  Health Maintenance   Topic Date Due    DXA SCAN  Never done    TDAP/TD VACCINES (1 - Tdap) Never done    ZOSTER VACCINE (2 of 3) 10/07/2014    DIABETIC FOOT EXAM  Never done    ANNUAL WELLNESS VISIT  08/28/2024    LIPID PANEL  08/28/2024    URINE MICROALBUMIN  08/28/2024    COVID-19 Vaccine (6 - 2023-24 season) 09/01/2024    HEMOGLOBIN A1C  09/04/2024    INFLUENZA VACCINE  08/01/2024    LUNG CANCER SCREENING  06/30/2025    DIABETIC EYE EXAM  07/08/2025    BMI FOLLOWUP  07/26/2025    MAMMOGRAM   08/29/2025    COLORECTAL CANCER SCREENING  02/14/2030    HEPATITIS C SCREENING  Completed    Pneumococcal Vaccine 65+  Completed                                                                                                                                                CMS Preventative Services Quick Reference  Risk Factors Identified During Encounter  Fall Risk-High or Moderate: Discussed Fall Prevention in the home  Immunizations Discussed/Encouraged: Tdap, Influenza, Shingrix, and COVID19    The above risks/problems have been discussed with the patient.  Pertinent information has been shared with the patient in the After Visit Summary.  An After Visit Summary and PPPS were made available to the patient.    Follow Up:   Next Medicare Wellness visit to be scheduled in 1 year.         Additional E&M Note during same encounter follows:  Patient has additional, significant, and separately identifiable condition(s)/problem(s) that require work above and beyond the Medicare Wellness Visit     Chief Complaint  Medicare Wellness-subsequent    Subjective   HPI  Dana is also being seen today for additional medical problem/s.  She is also here for follow up of hypertension. She was changed from propranolol to diovan earlier this year. She is currently on Diovan 160 mg daily and denies any headaches dizziness or chest pain. Her blood pressure was lower today than it's ever been.    She is also here for follow up of diabetes. She moniotrs her sugars on average 3-4 times a week and it is averaging around 120. She is currently on jardiance 25 daily and ozempic 0.5 weekly. Her last A1C was 7.2 and that was prior to starting the ozempic. She also has a history of neuropathy. She is seeing neurologist and is currently on lyrica and nortriptylline.  The plan is to increase the Lyrica as tolerated and hopefully wean the nortriptyline.    She also has a history of hyperlipidemia and is currently on simvastatin 40mg daily. She  "reports good compliance and tolerability of the medication.  Her last cholesterol was 167 with LDL of 64, HDL of 56, and triglycerides of 300.  She is fasting for recheck today.    She also has a history of anxiety. She is on clonazepam nightly as needed and is tolerating well.  She reports increased stress with helping to care for her mom but otherwise is doing well.          Review of Systems   Constitutional:  Positive for fatigue (mild, intermittent).   HENT:  Negative for ear pain and sinus pressure.    Respiratory:  Positive for wheezing (intermittent, does well with albuterol as needed). Negative for cough and shortness of breath.    Cardiovascular:  Negative for chest pain and palpitations.   Gastrointestinal:  Negative for abdominal pain, constipation and diarrhea.   Genitourinary:  Positive for dysuria. Flank pain: on ciprofloxacin for UTI.  Skin:  Negative for color change.   Neurological:  Negative for dizziness.   Psychiatric/Behavioral:  Negative for suicidal ideas. The patient is nervous/anxious.         Objective   Vital Signs:  BP 91/67   Pulse 89   Temp 97.6 °F (36.4 °C)   Resp 16   Ht 160 cm (63\")   Wt 87.3 kg (192 lb 6.4 oz)   SpO2 95%   BMI 34.08 kg/m²   Physical Exam  Constitutional:       General: She is not in acute distress.  HENT:      Head: Normocephalic and atraumatic.      Mouth/Throat:      Mouth: Mucous membranes are moist.   Eyes:      Conjunctiva/sclera: Conjunctivae normal.   Cardiovascular:      Rate and Rhythm: Normal rate and regular rhythm.      Heart sounds: No murmur heard.  Pulmonary:      Effort: No respiratory distress.      Breath sounds: Normal breath sounds.   Abdominal:      General: There is no distension.      Palpations: Abdomen is soft.      Tenderness: There is no abdominal tenderness.   Musculoskeletal:      Right lower leg: No edema.      Left lower leg: No edema.   Lymphadenopathy:      Cervical: No cervical adenopathy.   Skin:     General: Skin is warm. "      Findings: No lesion.   Neurological:      General: No focal deficit present.      Mental Status: She is alert.   Psychiatric:         Behavior: Behavior normal.                 Assessment and Plan               Encounter for subsequent annual wellness visit (AWV) in Medicare patient  Continue your current medications.   Aim for 150 minutes of aerobic exercise weekly as tolerated.  You had lab tests today. You should receive a call or my chart message with your test results. If you have not received your results in the next 7-10 days, please contact the office.    Continue yearly mammogram.  Colonoscopy is up-to-date and your next screening will be due in 2027.  Continue yearly chest CT until it has been 15 years since you stop smoking.  Your next screening is due June 2025.    Routine health maintenance, immunizations, and cancer screenings were discussed and encouraged.  Type 2 diabetes mellitus with diabetic neuropathy, without long-term current use of insulin    Continue current Jardiance and Ozempic.  If your A1c is above 6.5, we will increase your Ozempic to 1 mg weekly.  Primary hypertension    Reduce valsartan to 80 mg daily and monitor.  Blood pressure goal is 120/80.      Generalized anxiety disorder    Continue clonazepam nightly as needed.  This is a controlled medication and has risk of dependence and should only be taken as directed.  It requires routine monitoring.  Driss was reviewed and was appropriate.  Mixed hyperlipidemia     Continue simvastatin 40 mg daily.  LDL goal is under 70.  Elevated liver function tests  Continue follow-up with GI as scheduled.    Orders Placed This Encounter   Procedures    Comprehensive Metabolic Panel     Order Specific Question:   Release to patient     Answer:   Routine Release [[id number]]    Lipid Panel     Order Specific Question:   Release to patient     Answer:   Routine Release [[id number]]    TSH Rfx On Abnormal To Free T4     Order Specific Question:    Release to patient     Answer:   Routine Release [[id number]]    Hemoglobin A1c     Order Specific Question:   Release to patient     Answer:   Routine Release [[id number]]    CBC & Differential     Order Specific Question:   Manual Differential     Answer:   No     Order Specific Question:   Release to patient     Answer:   Routine Release [[id number]]     New Medications Ordered This Visit   Medications    valsartan (Diovan) 80 MG tablet     Sig: Take 1 tablet by mouth Daily.     Dispense:  90 tablet     Refill:  1    clonazePAM (KlonoPIN) 1 MG tablet     Sig: Take 1 tablet by mouth Daily As Needed for Anxiety. for anxiety     Dispense:  30 tablet     Refill:  2          Follow Up   Return in about 3 months (around 12/5/2024) for Next scheduled follow up.  Recheck here in 3 months as scheduled.    Patient was given instructions and counseling regarding her condition or for health maintenance advice. Please see specific information pulled into the AVS if appropriate.    Anel Jeronimo MD

## 2024-09-05 NOTE — PATIENT INSTRUCTIONS
Continue your current medications.   Aim for 150 minutes of aerobic exercise weekly as tolerated.  You had lab tests today. You should receive a call or my chart message with your test results. If you have not received your results in the next 7-10 days, please contact the office.    Continue yearly mammogram.  Colonoscopy is up-to-date and your next screening will be due in 2027.  Continue yearly chest CT until it has been 15 years since you stop smoking.  Your next screening is due June 2025.

## 2024-09-06 LAB
ALBUMIN SERPL-MCNC: 4.5 G/DL (ref 3.8–4.8)
ALP SERPL-CCNC: 73 IU/L (ref 44–121)
ALT SERPL-CCNC: 30 IU/L (ref 0–32)
AST SERPL-CCNC: 31 IU/L (ref 0–40)
BASOPHILS # BLD AUTO: 0 X10E3/UL (ref 0–0.2)
BASOPHILS NFR BLD AUTO: 1 %
BILIRUB SERPL-MCNC: 0.5 MG/DL (ref 0–1.2)
BUN SERPL-MCNC: 17 MG/DL (ref 8–27)
BUN/CREAT SERPL: 20 (ref 12–28)
CALCIUM SERPL-MCNC: 10 MG/DL (ref 8.7–10.3)
CHLORIDE SERPL-SCNC: 101 MMOL/L (ref 96–106)
CHOLEST SERPL-MCNC: 166 MG/DL (ref 100–199)
CO2 SERPL-SCNC: 22 MMOL/L (ref 20–29)
CREAT SERPL-MCNC: 0.85 MG/DL (ref 0.57–1)
EGFRCR SERPLBLD CKD-EPI 2021: 73 ML/MIN/1.73
EOSINOPHIL # BLD AUTO: 0.1 X10E3/UL (ref 0–0.4)
EOSINOPHIL NFR BLD AUTO: 2 %
ERYTHROCYTE [DISTWIDTH] IN BLOOD BY AUTOMATED COUNT: 12.6 % (ref 11.7–15.4)
GLOBULIN SER CALC-MCNC: 2.5 G/DL (ref 1.5–4.5)
GLUCOSE SERPL-MCNC: 97 MG/DL (ref 70–99)
HBA1C MFR BLD: 5.9 % (ref 4.8–5.6)
HCT VFR BLD AUTO: 44.7 % (ref 34–46.6)
HDLC SERPL-MCNC: 58 MG/DL
HGB BLD-MCNC: 15 G/DL (ref 11.1–15.9)
IMM GRANULOCYTES # BLD AUTO: 0 X10E3/UL (ref 0–0.1)
IMM GRANULOCYTES NFR BLD AUTO: 0 %
LDLC SERPL CALC-MCNC: 72 MG/DL (ref 0–99)
LYMPHOCYTES # BLD AUTO: 1.9 X10E3/UL (ref 0.7–3.1)
LYMPHOCYTES NFR BLD AUTO: 31 %
MCH RBC QN AUTO: 32.1 PG (ref 26.6–33)
MCHC RBC AUTO-ENTMCNC: 33.6 G/DL (ref 31.5–35.7)
MCV RBC AUTO: 96 FL (ref 79–97)
MONOCYTES # BLD AUTO: 0.3 X10E3/UL (ref 0.1–0.9)
MONOCYTES NFR BLD AUTO: 5 %
NEUTROPHILS # BLD AUTO: 3.8 X10E3/UL (ref 1.4–7)
NEUTROPHILS NFR BLD AUTO: 61 %
PLATELET # BLD AUTO: 210 X10E3/UL (ref 150–450)
POTASSIUM SERPL-SCNC: 4.4 MMOL/L (ref 3.5–5.2)
PROT SERPL-MCNC: 7 G/DL (ref 6–8.5)
RBC # BLD AUTO: 4.67 X10E6/UL (ref 3.77–5.28)
SODIUM SERPL-SCNC: 140 MMOL/L (ref 134–144)
TRIGL SERPL-MCNC: 223 MG/DL (ref 0–149)
TSH SERPL DL<=0.005 MIU/L-ACNC: 1.8 UIU/ML (ref 0.45–4.5)
VLDLC SERPL CALC-MCNC: 36 MG/DL (ref 5–40)
WBC # BLD AUTO: 6.2 X10E3/UL (ref 3.4–10.8)

## 2024-09-13 ENCOUNTER — HOSPITAL ENCOUNTER (OUTPATIENT)
Facility: HOSPITAL | Age: 74
Discharge: HOME | End: 2024-09-13
Payer: MEDICARE

## 2024-09-13 DIAGNOSIS — K75.81 NASH (NONALCOHOLIC STEATOHEPATITIS): ICD-10-CM

## 2024-09-13 DIAGNOSIS — R74.8 ELEVATED LIVER ENZYMES: ICD-10-CM

## 2024-09-13 PROCEDURE — 76705 ECHO EXAM OF ABDOMEN: CPT

## 2024-09-25 ENCOUNTER — APPOINTMENT (OUTPATIENT)
Dept: WOMENS IMAGING | Facility: HOSPITAL | Age: 74
End: 2024-09-25
Payer: MEDICARE

## 2024-09-25 PROCEDURE — 77067 SCR MAMMO BI INCL CAD: CPT | Performed by: RADIOLOGY

## 2024-09-25 PROCEDURE — 77063 BREAST TOMOSYNTHESIS BI: CPT | Performed by: RADIOLOGY

## 2024-10-25 ENCOUNTER — TELEPHONE (OUTPATIENT)
Dept: FAMILY MEDICINE CLINIC | Facility: CLINIC | Age: 74
End: 2024-10-25
Payer: MEDICARE

## 2024-10-25 NOTE — TELEPHONE ENCOUNTER
Patient called and said she went to the [facility] for UTI, they gave her an antibiotic, but the symptoms never resolved. She went back to the [facility] earlier this week, they did another culture and it was positive so they gave her a different antibiotic. She is now experiencing severe back pain. Spoke to Dr. Friedman verbally and she recommended pt to go to the ER for eval for possible kidney infection. Pt is aware and will head to the ER

## 2024-11-18 RX ORDER — SIMVASTATIN 40 MG
40 TABLET ORAL DAILY
Qty: 30 TABLET | Refills: 2 | Status: SHIPPED | OUTPATIENT
Start: 2024-11-18

## 2024-11-18 RX ORDER — SEMAGLUTIDE 0.68 MG/ML
INJECTION, SOLUTION SUBCUTANEOUS
Qty: 3 ML | Refills: 3 | Status: SHIPPED | OUTPATIENT
Start: 2024-11-18

## 2024-12-01 DIAGNOSIS — F41.1 GENERALIZED ANXIETY DISORDER: ICD-10-CM

## 2024-12-02 NOTE — TELEPHONE ENCOUNTER
Rx Refill Note  Requested Prescriptions     Pending Prescriptions Disp Refills    clonazePAM (KlonoPIN) 1 MG tablet [Pharmacy Med Name: clonazePAM 1 MG TABLET] 30 tablet      Sig: TAKE 1 TABLET BY MOUTH DAILY AS NEEDED FOR ANXIETY      Last office visit with prescribing clinician: 9/5/2024   Last telemedicine visit with prescribing clinician: Visit date not found   Next office visit with prescribing clinician: 12/5/2024     Sangeeta Clemente  12/02/24, 09:07 EST

## 2024-12-03 RX ORDER — CLONAZEPAM 1 MG/1
1 TABLET ORAL DAILY PRN
Qty: 30 TABLET | Refills: 0 | Status: SHIPPED | OUTPATIENT
Start: 2024-12-03

## 2024-12-05 ENCOUNTER — OFFICE VISIT (OUTPATIENT)
Dept: FAMILY MEDICINE CLINIC | Facility: CLINIC | Age: 74
End: 2024-12-05
Payer: MEDICARE

## 2024-12-05 VITALS
BODY MASS INDEX: 33.38 KG/M2 | HEIGHT: 63 IN | HEART RATE: 82 BPM | DIASTOLIC BLOOD PRESSURE: 76 MMHG | WEIGHT: 188.4 LBS | OXYGEN SATURATION: 95 % | SYSTOLIC BLOOD PRESSURE: 118 MMHG

## 2024-12-05 DIAGNOSIS — F51.01 PRIMARY INSOMNIA: ICD-10-CM

## 2024-12-05 DIAGNOSIS — I10 PRIMARY HYPERTENSION: ICD-10-CM

## 2024-12-05 DIAGNOSIS — F41.1 GENERALIZED ANXIETY DISORDER: ICD-10-CM

## 2024-12-05 DIAGNOSIS — E11.40 TYPE 2 DIABETES MELLITUS WITH DIABETIC NEUROPATHY, WITHOUT LONG-TERM CURRENT USE OF INSULIN: ICD-10-CM

## 2024-12-05 DIAGNOSIS — E78.2 MIXED HYPERLIPIDEMIA: ICD-10-CM

## 2024-12-05 DIAGNOSIS — R30.0 DYSURIA: Primary | ICD-10-CM

## 2024-12-05 DIAGNOSIS — Z12.11 SCREENING FOR COLON CANCER: ICD-10-CM

## 2024-12-05 DIAGNOSIS — R35.0 FREQUENCY OF MICTURITION: ICD-10-CM

## 2024-12-05 LAB
BILIRUB BLD-MCNC: NEGATIVE MG/DL
CLARITY, POC: CLEAR
COLOR UR: YELLOW
EXPIRATION DATE: ABNORMAL
GLUCOSE UR STRIP-MCNC: ABNORMAL MG/DL
KETONES UR QL: NEGATIVE
LEUKOCYTE EST, POC: NEGATIVE
Lab: ABNORMAL
NITRITE UR-MCNC: NEGATIVE MG/ML
PH UR: 6 [PH] (ref 5–8)
PROT UR STRIP-MCNC: NEGATIVE MG/DL
RBC # UR STRIP: NEGATIVE /UL
SP GR UR: 1 (ref 1–1.03)
UROBILINOGEN UR QL: ABNORMAL

## 2024-12-05 PROCEDURE — 3044F HG A1C LEVEL LT 7.0%: CPT | Performed by: FAMILY MEDICINE

## 2024-12-05 PROCEDURE — 3078F DIAST BP <80 MM HG: CPT | Performed by: FAMILY MEDICINE

## 2024-12-05 PROCEDURE — 99214 OFFICE O/P EST MOD 30 MIN: CPT | Performed by: FAMILY MEDICINE

## 2024-12-05 PROCEDURE — 1160F RVW MEDS BY RX/DR IN RCRD: CPT | Performed by: FAMILY MEDICINE

## 2024-12-05 PROCEDURE — 3074F SYST BP LT 130 MM HG: CPT | Performed by: FAMILY MEDICINE

## 2024-12-05 PROCEDURE — 1159F MED LIST DOCD IN RCRD: CPT | Performed by: FAMILY MEDICINE

## 2024-12-05 PROCEDURE — G2211 COMPLEX E/M VISIT ADD ON: HCPCS | Performed by: FAMILY MEDICINE

## 2024-12-05 PROCEDURE — 81003 URINALYSIS AUTO W/O SCOPE: CPT | Performed by: FAMILY MEDICINE

## 2024-12-05 RX ORDER — PREGABALIN 100 MG/1
CAPSULE ORAL
COMMUNITY
Start: 2024-11-07

## 2024-12-05 NOTE — PATIENT INSTRUCTIONS
Continue your current medications.   Urinalysis was normal.  Continue increased water.  If urine culture does infection, we may need to consider stopping the jardiance and see if ozempic alone will keep diabetes at goal.   Klonopin is a controlled medication. This medication has the risk of dependence and addiction. Use the medication only as directed. You will be required to be seen every 90 days and have routine drug screening and Driss monitoring while on the medication. If you stop the medication, be sure to dispose of the medication properly.

## 2024-12-05 NOTE — PROGRESS NOTES
"Chief Complaint  Diabetes    Subjective    History of Present Illness {  Problem List  Visit  Diagnosis   Encounters  Notes  Medications  Labs  Result Review Imaging  Media :23}     Dana Cerda presents to [facility] for Diabetes.  History of Present Illness     She presents for follow up of hypertension.  Her blood pressure has been well-controlled on valsartan 80 mg daily.  She denies any headaches, dizziness, or chest pain.    She is also here for follow up of hyperlipidemia. She is currently on simvastatin 40.  She reports good compliance and tolerability of the medication.  Her last cholesterol was 166 with LDL of 72, HDL 58, and triglycerides of 223.    She also has a history of diabetes.  She is currently on Ozempic 0.5 mg weekly and Jardiance 25 mg daily.  Her last A1c was at goal at 5.9.  She has had recent urinary tract infections but is otherwise tolerating the medication well.  She has a history of neuropathy and is currently on pregabalin through her neurologist.    She is also here for follow-up of anxiety.  She is currently on Klonopin nightly sleep and anxiety.  She also takes Xanax rare occasion as needed.  Is been 8 months since her last refill.    She also reports 4-5 day history of increased frequency and mild suprapubic tenderness. She has had 2 UTI's in the last 2 months. She was seen at [facility] for these and treated.  For the first she was given ciprofloxacin.  For the second she was initially given Cipro and then changed to Macrobid once the culture showed resistance to ciprofloxacin.    Objective     Vital Signs:   /76   Pulse 82   Ht 160 cm (63\")   Wt 85.5 kg (188 lb 6.4 oz)   SpO2 95%   BMI 33.37 kg/m²   Body mass index is 33.37 kg/m².     Physical Exam  Constitutional:       General: She is not in acute distress.  Cardiovascular:      Rate and Rhythm: Normal rate and regular rhythm.      Heart sounds: No murmur " heard.  Pulmonary:      Effort: No respiratory distress.      Breath sounds: Normal breath sounds.   Abdominal:      General: There is no distension.      Palpations: Abdomen is soft.      Tenderness: There is no abdominal tenderness.   Neurological:      General: No focal deficit present.      Mental Status: She is alert.   Psychiatric:         Behavior: Behavior normal.          Result Review  Data Reviewed:{ Labs  Result Review  Imaging  Med Tab  Media :23}                Assessment and Plan {CC Problem List  Visit Diagnosis  ROS  Review (Popup)  Health Maintenance  Quality  BestPractice  Medications  SmartSets  SnapShot Encounters  Media :23}   Diagnoses and all orders for this visit:    1. Dysuria (Primary)  -     POC Urinalysis Dipstick, Automated  -     Urine Culture - Urine, Urine, Clean Catch    2. Primary hypertension    3. Mixed hyperlipidemia    4. Type 2 diabetes mellitus with diabetic neuropathy, without long-term current use of insulin    5. Generalized anxiety disorder    6. Primary insomnia    7. Screening for colon cancer  -     Ambulatory Referral to Gastroenterology    8. Frequency of micturition  -     POC Urinalysis Dipstick, Automated  -     Urine Culture - Urine, Urine, Clean Catch        Patient Instructions   Continue your current medications.   Urinalysis was normal.  Continue increased water.  If urine culture does infection, we may need to consider stopping the jardiance and see if ozempic alone will keep diabetes at goal.   Klonopin is a controlled medication. This medication has the risk of dependence and addiction. Use the medication only as directed. You will be required to be seen every 90 days and have routine drug screening and Driss monitoring while on the medication. If you stop the medication, be sure to dispose of the medication properly.     Driss reviewed and was appropriate.    Patient was given instructions and counseling regarding her condition or for  health maintenance advice on the AVS.       Return in about 3 months (around 3/5/2025) for Recheck.    Anel Jeronimo MD

## 2024-12-07 LAB
BACTERIA UR CULT: NO GROWTH
BACTERIA UR CULT: NORMAL

## 2025-01-06 DIAGNOSIS — F41.1 GENERALIZED ANXIETY DISORDER: ICD-10-CM

## 2025-01-09 RX ORDER — CLONAZEPAM 1 MG/1
1 TABLET ORAL DAILY PRN
Qty: 30 TABLET | Refills: 1 | Status: SHIPPED | OUTPATIENT
Start: 2025-01-09

## 2025-02-12 RX ORDER — SIMVASTATIN 40 MG
40 TABLET ORAL DAILY
Qty: 30 TABLET | Refills: 2 | Status: SHIPPED | OUTPATIENT
Start: 2025-02-12

## 2025-02-19 RX ORDER — EMPAGLIFLOZIN 25 MG/1
25 TABLET, FILM COATED ORAL DAILY
Qty: 90 TABLET | Refills: 1 | Status: SHIPPED | OUTPATIENT
Start: 2025-02-19

## 2025-02-26 NOTE — TELEPHONE ENCOUNTER
Rx Refill Note  Requested Prescriptions     Pending Prescriptions Disp Refills    OneTouch Verio test strip [Pharmacy Med Name: ONETOUCH VERIO TEST STRIP]       Sig: USE 1 STRIP TO TEST DAILY AND AS NEEDED    valsartan (DIOVAN) 80 MG tablet [Pharmacy Med Name: VALSARTAN 80 MG TABLET] 90 tablet 1     Sig: TAKE 1 TABLET BY MOUTH DAILY      Last office visit with prescribing clinician: 12/5/2024   Next office visit with prescribing clinician: 3/3/2025     Ramon Jorge MA  02/26/25, 16:37 EST

## 2025-02-27 RX ORDER — BLOOD SUGAR DIAGNOSTIC
STRIP MISCELLANEOUS
Qty: 50 EACH | Refills: 5 | Status: SHIPPED | OUTPATIENT
Start: 2025-02-27

## 2025-02-27 RX ORDER — VALSARTAN 80 MG/1
80 TABLET ORAL DAILY
Qty: 90 TABLET | Refills: 1 | Status: SHIPPED | OUTPATIENT
Start: 2025-02-27

## 2025-03-03 ENCOUNTER — OFFICE VISIT (OUTPATIENT)
Dept: FAMILY MEDICINE CLINIC | Facility: CLINIC | Age: 75
End: 2025-03-03
Payer: MEDICARE

## 2025-03-03 VITALS
OXYGEN SATURATION: 94 % | WEIGHT: 191 LBS | SYSTOLIC BLOOD PRESSURE: 115 MMHG | HEART RATE: 78 BPM | BODY MASS INDEX: 33.84 KG/M2 | DIASTOLIC BLOOD PRESSURE: 82 MMHG | HEIGHT: 63 IN

## 2025-03-03 DIAGNOSIS — Z79.899 HIGH RISK MEDICATION USE: ICD-10-CM

## 2025-03-03 DIAGNOSIS — E11.40 TYPE 2 DIABETES MELLITUS WITH DIABETIC NEUROPATHY, WITHOUT LONG-TERM CURRENT USE OF INSULIN: Primary | ICD-10-CM

## 2025-03-03 DIAGNOSIS — F51.01 PRIMARY INSOMNIA: ICD-10-CM

## 2025-03-03 DIAGNOSIS — F41.1 GENERALIZED ANXIETY DISORDER: ICD-10-CM

## 2025-03-03 DIAGNOSIS — I10 PRIMARY HYPERTENSION: ICD-10-CM

## 2025-03-03 DIAGNOSIS — E78.2 MIXED HYPERLIPIDEMIA: ICD-10-CM

## 2025-03-03 PROCEDURE — 3079F DIAST BP 80-89 MM HG: CPT | Performed by: FAMILY MEDICINE

## 2025-03-03 PROCEDURE — 3074F SYST BP LT 130 MM HG: CPT | Performed by: FAMILY MEDICINE

## 2025-03-03 PROCEDURE — 99214 OFFICE O/P EST MOD 30 MIN: CPT | Performed by: FAMILY MEDICINE

## 2025-03-03 PROCEDURE — G2211 COMPLEX E/M VISIT ADD ON: HCPCS | Performed by: FAMILY MEDICINE

## 2025-03-03 RX ORDER — CLONAZEPAM 1 MG/1
1 TABLET ORAL DAILY PRN
Qty: 30 TABLET | Refills: 2 | Status: SHIPPED | OUTPATIENT
Start: 2025-03-03

## 2025-03-03 RX ORDER — PREGABALIN 150 MG/1
CAPSULE ORAL
COMMUNITY
Start: 2025-02-07

## 2025-03-03 NOTE — PATIENT INSTRUCTIONS
Continue your current medications, healthy diet, and regular exercise.   You had lab tests today. You should receive a call or my chart message with your test results. If you have not received your results in the next 7-10 days, please contact the office.    Klonopin and xanax are controlled medications and have the risk of dependence and addiction. Use the medication only as directed. You will be required to be seen every 90 days and have routine drug screening and Driss monitoring while on the medication. If you stop the medication, be sure to dispose of the medication properly.

## 2025-03-03 NOTE — PROGRESS NOTES
"Chief Complaint  Diabetes    Subjective    History of Present Illness {CC  Problem List  Visit  Diagnosis   Encounters  Notes  Medications  Labs  Result Review Imaging  Media :23}     Dana Cerda presents to [facility] for Diabetes.  History of Present Illness   She presents for follow up of diabetes. She is currently on ozempic 0.5 weekly and jardiance 25 daily. She has some constipation with ozempic but it is tolerable. Her sugars have been doing well and her last A1C was improved at 5.9. She has a history of neuropathy and sees neurologist. She is now on nortriptyline 25 nightly and lyrica 150 twice daily and has noticed improvement.    She is also here for follow up of hypertension. She is stable on valsartan 80mg daily. She denies any headaches, dizziness, or chest pain.     She is also here for follow up of hyperlipidemia. She is currently on simvastatin 40mg daily. She reports good compliance and tolerability of the medication. Her last cholesterol was 166 with LDL 72, HDL 58 and triglycerides of 223.     She also has a history of anxiety and insomnia. She is doing well on klonopin nightly. She also takes xanax on rare occasion. Her last refill of xanax was almost 1 year ago for #30 and she still has some remaining.       Objective     Vital Signs:   /82   Pulse 78   Ht 160 cm (63\")   Wt 86.6 kg (191 lb)   SpO2 94%   BMI 33.83 kg/m²   Body mass index is 33.83 kg/m².     Physical Exam  Constitutional:       General: She is not in acute distress.  Cardiovascular:      Rate and Rhythm: Normal rate and regular rhythm.      Heart sounds: No murmur heard.  Pulmonary:      Effort: No respiratory distress.      Breath sounds: Normal breath sounds.   Neurological:      General: No focal deficit present.      Mental Status: She is alert.   Psychiatric:         Behavior: Behavior normal.          Result Review  Data Reviewed:{ Labs  Result Review  Imaging  Med " Tab  Media :23}                Assessment and Plan {CC Problem List  Visit Diagnosis  ROS  Review (Popup)  Health Maintenance  Quality  BestPractice  Medications  SmartSets  SnapShot Encounters  Media :23}   Diagnoses and all orders for this visit:    1. Type 2 diabetes mellitus with diabetic neuropathy, without long-term current use of insulin (Primary)  -     Comprehensive Metabolic Panel  -     Hemoglobin A1c  -     Microalbumin / Creatinine Urine Ratio - Urine, Clean Catch    2. Primary hypertension  -     Comprehensive Metabolic Panel    3. Mixed hyperlipidemia  -     Lipid Panel    4. Primary insomnia  -     Urine Drug Screen - Urine, Clean Catch    5. Generalized anxiety disorder  -     clonazePAM (KlonoPIN) 1 MG tablet; Take 1 tablet by mouth Daily As Needed for Anxiety (and sleep). for anxiety  Dispense: 30 tablet; Refill: 2    6. High risk medication use  -     Urine Drug Screen - Urine, Clean Catch        Patient Instructions   Continue your current medications, healthy diet, and regular exercise.   You had lab tests today. You should receive a call or my chart message with your test results. If you have not received your results in the next 7-10 days, please contact the office.    Klonopin and xanax are controlled medications and have the risk of dependence and addiction. Use the medication only as directed. You will be required to be seen every 90 days and have routine drug screening and Driss monitoring while on the medication. If you stop the medication, be sure to dispose of the medication properly.       Driss reviewed and was appropriate.  Controlled substance prescribing agreement reviewed and signed.    Patient was given instructions and counseling regarding her condition or for health maintenance advice on the AVS.       Return in about 3 months (around 6/3/2025) for Recheck.    Anel Jeronimo MD

## 2025-03-04 LAB
ALBUMIN SERPL-MCNC: 4.5 G/DL (ref 3.5–5.2)
ALBUMIN/CREAT UR: <9 MG/G CREAT (ref 0–29)
ALBUMIN/GLOB SERPL: 1.7 G/DL
ALP SERPL-CCNC: 84 U/L (ref 39–117)
ALT SERPL-CCNC: 29 U/L (ref 1–33)
AMPHETAMINES UR QL SCN: NEGATIVE NG/ML
AST SERPL-CCNC: 30 U/L (ref 1–32)
BARBITURATES UR QL SCN: NEGATIVE NG/ML
BENZODIAZ UR QL SCN: NEGATIVE NG/ML
BILIRUB SERPL-MCNC: 0.5 MG/DL (ref 0–1.2)
BUN SERPL-MCNC: 14 MG/DL (ref 8–23)
BUN/CREAT SERPL: 17.7 (ref 7–25)
BZE UR QL SCN: NEGATIVE NG/ML
CALCIUM SERPL-MCNC: 10.6 MG/DL (ref 8.6–10.5)
CANNABINOIDS UR QL SCN: NEGATIVE NG/ML
CHLORIDE SERPL-SCNC: 101 MMOL/L (ref 98–107)
CHOLEST SERPL-MCNC: 168 MG/DL (ref 0–200)
CO2 SERPL-SCNC: 29.8 MMOL/L (ref 22–29)
CREAT SERPL-MCNC: 0.79 MG/DL (ref 0.57–1)
CREAT UR-MCNC: 32.2 MG/DL (ref 20–300)
CREAT UR-MCNC: 32.3 MG/DL
EGFRCR SERPLBLD CKD-EPI 2021: 79.6 ML/MIN/1.73
GLOBULIN SER CALC-MCNC: 2.7 GM/DL
GLUCOSE SERPL-MCNC: 102 MG/DL (ref 65–99)
HBA1C MFR BLD: 5.8 % (ref 4.8–5.6)
HDLC SERPL-MCNC: 64 MG/DL (ref 40–60)
LABORATORY COMMENT REPORT: NORMAL
LDLC SERPL CALC-MCNC: 69 MG/DL (ref 0–100)
METHADONE UR QL SCN: NEGATIVE NG/ML
MICROALBUMIN UR-MCNC: <3 UG/ML
OPIATES UR QL SCN: NEGATIVE NG/ML
OXYCODONE+OXYMORPHONE UR QL SCN: NEGATIVE NG/ML
PCP UR QL: NEGATIVE NG/ML
PH UR: 5.6 [PH] (ref 4.5–8.9)
POTASSIUM SERPL-SCNC: 4.7 MMOL/L (ref 3.5–5.2)
PROPOXYPH UR QL SCN: NEGATIVE NG/ML
PROT SERPL-MCNC: 7.2 G/DL (ref 6–8.5)
SODIUM SERPL-SCNC: 142 MMOL/L (ref 136–145)
TRIGL SERPL-MCNC: 215 MG/DL (ref 0–150)
VLDLC SERPL CALC-MCNC: 35 MG/DL (ref 5–40)

## 2025-03-10 RX ORDER — SEMAGLUTIDE 0.68 MG/ML
0.5 INJECTION, SOLUTION SUBCUTANEOUS WEEKLY
Qty: 3 ML | Refills: 3 | Status: SHIPPED | OUTPATIENT
Start: 2025-03-10

## 2025-03-10 NOTE — TELEPHONE ENCOUNTER
Rx Refill Note  Requested Prescriptions     Pending Prescriptions Disp Refills    Ozempic, 0.25 or 0.5 MG/DOSE, 2 MG/3ML solution pen-injector [Pharmacy Med Name: OZEMPIC 0.25-0.5 MG/DOSE(2 MG/3 ML)] 3 mL 3     Sig: DIAL AND INJECT UNDER THE SKIN 0.25 MG WEEKLY FOR 4 WEEKS THEN DIAL AND INJECT 0.5 MG WEEKLY THEREAFTER      Last office visit with prescribing clinician: 3/3/2025   Next office visit with prescribing clinician: 6/4/2025     Ramon Jorge MA  03/10/25, 09:15 EDT

## 2025-04-09 RX ORDER — SIMVASTATIN 40 MG
40 TABLET ORAL DAILY
Qty: 90 TABLET | Refills: 0 | Status: SHIPPED | OUTPATIENT
Start: 2025-04-09

## 2025-04-09 NOTE — TELEPHONE ENCOUNTER
Rx Refill Note  Requested Prescriptions     Pending Prescriptions Disp Refills    simvastatin (ZOCOR) 40 MG tablet 90 tablet 0     Sig: Take 1 tablet by mouth Daily.      Last office visit with prescribing clinician: 3/3/2025   Next office visit with prescribing clinician: 6/4/2025     Regi Esqueda MA  04/09/25, 09:35 EDT

## 2025-06-02 DIAGNOSIS — F41.1 GENERALIZED ANXIETY DISORDER: ICD-10-CM

## 2025-06-04 ENCOUNTER — OFFICE VISIT (OUTPATIENT)
Dept: FAMILY MEDICINE CLINIC | Facility: CLINIC | Age: 75
End: 2025-06-04
Payer: MEDICARE

## 2025-06-04 VITALS
SYSTOLIC BLOOD PRESSURE: 99 MMHG | BODY MASS INDEX: 34.38 KG/M2 | WEIGHT: 194 LBS | OXYGEN SATURATION: 97 % | HEART RATE: 77 BPM | HEIGHT: 63 IN | DIASTOLIC BLOOD PRESSURE: 68 MMHG

## 2025-06-04 DIAGNOSIS — F51.01 PRIMARY INSOMNIA: ICD-10-CM

## 2025-06-04 DIAGNOSIS — F41.1 GENERALIZED ANXIETY DISORDER: Primary | ICD-10-CM

## 2025-06-04 DIAGNOSIS — E11.40 TYPE 2 DIABETES MELLITUS WITH DIABETIC NEUROPATHY, WITHOUT LONG-TERM CURRENT USE OF INSULIN: ICD-10-CM

## 2025-06-04 DIAGNOSIS — I10 PRIMARY HYPERTENSION: ICD-10-CM

## 2025-06-04 RX ORDER — CLONAZEPAM 1 MG/1
TABLET ORAL
Qty: 30 TABLET | Refills: 2 | Status: SHIPPED | OUTPATIENT
Start: 2025-06-04

## 2025-06-04 NOTE — PROGRESS NOTES
"Chief Complaint  Anxiety    Subjective    History of Present Illness {CC  Problem List  Visit  Diagnosis   Encounters  Notes  Medications  Labs  Result Review Imaging  Media :23}     Dana Cerda presents to [facility] for Anxiety.  History of Present Illness   She presents for follow up of anxiety. She is currently doing well on klonopin nightly for sleep and anxiey. She denies any depression or suicidal ideation. She also has  xanax to use as needed but it is very rare. It has been more than a year since her last refil.    She is also here for follow up of hypertension. She is currently on valsartan 80mg daily. Her blood pressure is a little low today but it is normally 110-120/70-80. She denies any dizziness or lightheadedness.     She has a history of diabetes and is currently on jardiance and semaglutide. Her last A1C was at goal at 5.8. She is seeing neurologist for neuropathy and is now on lyrica 150 twice a day and is doing much better than on gabapentin. Her pain is better and she has less side effects.     Objective     Vital Signs:   BP 99/68   Pulse 77   Ht 160 cm (62.99\")   Wt 88 kg (194 lb)   SpO2 97%   BMI 34.37 kg/m²   Body mass index is 34.37 kg/m².     Physical Exam  Constitutional:       General: She is not in acute distress.  Cardiovascular:      Rate and Rhythm: Normal rate and regular rhythm.   Pulmonary:      Effort: No respiratory distress.   Psychiatric:         Behavior: Behavior normal.          Result Review  Data Reviewed:{ Labs  Result Review  Imaging  Med Tab  Media :23}                Assessment and Plan {CC Problem List  Visit Diagnosis  ROS  Review (Popup)  Health Maintenance  Quality  BestPractice  Medications  SmartSets  SnapShot Encounters  Media :23}   Diagnoses and all orders for this visit:    1. Generalized anxiety disorder (Primary)    2. Primary hypertension    3. Primary insomnia    4. Type 2 diabetes " mellitus with diabetic neuropathy, without long-term current use of insulin        Patient Instructions   Continue your current medications, healthy diet and regular exercise.  Monitor blood pressure. If remains low or having dizzines, let us know and we will reduce medication.  Klonopin and lyrica are controlled medications and have the risk of dependence and addiction. Use the medication only as directed. You will be required to be seen every 90 days and have routine drug screening and Driss monitoring while on the medication. If you stop the medication, be sure to dispose of the medication properly.     Driss reviewed and was appropriate.    Patient was given instructions and counseling regarding her condition or for health maintenance advice on the AVS.       Return in about 3 months (around 9/4/2025) for Medicare Wellness after 9/5.    Anel Jeronimo MD

## 2025-06-27 RX ORDER — SEMAGLUTIDE 0.68 MG/ML
0.5 INJECTION, SOLUTION SUBCUTANEOUS WEEKLY
Qty: 3 ML | Refills: 3 | Status: SHIPPED | OUTPATIENT
Start: 2025-06-27

## 2025-07-06 RX ORDER — SIMVASTATIN 40 MG
40 TABLET ORAL DAILY
Qty: 90 TABLET | Refills: 0 | Status: SHIPPED | OUTPATIENT
Start: 2025-07-06

## 2025-07-23 ENCOUNTER — TELEPHONE (OUTPATIENT)
Dept: FAMILY MEDICINE CLINIC | Facility: CLINIC | Age: 75
End: 2025-07-23
Payer: MEDICARE

## 2025-07-23 DIAGNOSIS — Z87.891 HISTORY OF NICOTINE DEPENDENCE: Primary | ICD-10-CM

## 2025-07-23 NOTE — TELEPHONE ENCOUNTER
"    Caller: Dana Cerda \"ANNA\"    Relationship: Self    Best call back number: [phone]    What orders are you requesting (i.e. lab or imaging): FOLLOW UP LUNG CANCER SCREENING     Where will you receive your lab/imaging services: [facility]    Additional notes: PATIENT STATES THAT SHE GOT A NOTIFICATION THAT SHE IS OVERDUE FOR A FOLLOW UP LUNG CANCER SCREENING AND SHE WOULD LIKE THE ORDER TO BE PLACED TO HAVE THIS DONE.   "

## 2025-08-08 ENCOUNTER — HOSPITAL ENCOUNTER (OUTPATIENT)
Facility: HOSPITAL | Age: 75
Discharge: HOME | End: 2025-08-08
Payer: MEDICARE

## 2025-08-08 DIAGNOSIS — Z87.891 HISTORY OF NICOTINE DEPENDENCE: ICD-10-CM

## 2025-08-08 PROCEDURE — 71271 CT THORAX LUNG CANCER SCR C-: CPT

## 2025-08-18 RX ORDER — EMPAGLIFLOZIN 25 MG/1
25 TABLET, FILM COATED ORAL DAILY
Qty: 90 TABLET | Refills: 1 | Status: SHIPPED | OUTPATIENT
Start: 2025-08-18

## 2025-08-20 RX ORDER — VALSARTAN 80 MG/1
80 TABLET ORAL DAILY
Qty: 90 TABLET | Refills: 1 | Status: SHIPPED | OUTPATIENT
Start: 2025-08-20